# Patient Record
Sex: MALE | Employment: FULL TIME | URBAN - METROPOLITAN AREA
[De-identification: names, ages, dates, MRNs, and addresses within clinical notes are randomized per-mention and may not be internally consistent; named-entity substitution may affect disease eponyms.]

---

## 2020-04-03 ENCOUNTER — OFFICE VISIT (OUTPATIENT)
Dept: NEUROLOGY | Age: 55
End: 2020-04-03

## 2020-04-03 VITALS
OXYGEN SATURATION: 99 % | BODY MASS INDEX: 34.65 KG/M2 | HEART RATE: 80 BPM | DIASTOLIC BLOOD PRESSURE: 80 MMHG | SYSTOLIC BLOOD PRESSURE: 150 MMHG | RESPIRATION RATE: 16 BRPM | HEIGHT: 74 IN | WEIGHT: 270 LBS | TEMPERATURE: 98.6 F

## 2020-04-03 DIAGNOSIS — R20.2 PARESTHESIA: ICD-10-CM

## 2020-04-03 DIAGNOSIS — R20.2 PARESTHESIA: Primary | ICD-10-CM

## 2020-04-03 RX ORDER — LISINOPRIL 20 MG/1
20 TABLET ORAL DAILY
COMMUNITY

## 2020-04-03 RX ORDER — GABAPENTIN 600 MG/1
1200 TABLET ORAL DAILY
COMMUNITY
Start: 2020-01-21

## 2020-04-03 RX ORDER — HYDROCHLOROTHIAZIDE 25 MG/1
25 TABLET ORAL DAILY
COMMUNITY
Start: 2020-02-02

## 2020-04-03 RX ORDER — DULOXETIN HYDROCHLORIDE 30 MG/1
30 CAPSULE, DELAYED RELEASE ORAL DAILY
Qty: 30 CAP | Refills: 3 | Status: SHIPPED | OUTPATIENT
Start: 2020-04-03 | End: 2020-10-08

## 2020-04-03 RX ORDER — ATORVASTATIN CALCIUM 20 MG/1
20 TABLET, FILM COATED ORAL DAILY
COMMUNITY

## 2020-04-03 RX ORDER — GLIMEPIRIDE 4 MG/1
4 TABLET ORAL
COMMUNITY
Start: 2020-02-03

## 2020-04-03 RX ORDER — METFORMIN HYDROCHLORIDE 750 MG/1
750 TABLET, EXTENDED RELEASE ORAL DAILY
COMMUNITY

## 2020-04-03 NOTE — LETTER
4/3/20 Patient: Adriano Weber YOB: 1965 Date of Visit: 4/3/2020 Capri Marin MD 
[address] [facility] [address] [facility] [address] VIA Facsimile: [phone] [address] [place] [address] 
[address] [facility] 7 [phone] VIA In Basket Dear Capri Marin MD 
[address] [place] [address], Thank you for referring Mr. Mya Haddad to [facility] for evaluation. My notes for this consultation are attached. If you have questions, please do not hesitate to call me. I look forward to following your patient along with you. Sincerely, Sanchez Perdue MD

## 2020-04-03 NOTE — PROGRESS NOTES
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 C/o stinging, burning feeling, sometimes feels like a knife in his feet, and do sometimes go numb. X3 weeks he started noticing it in his hands. \"Sometimes driving home, my hands hurt so bad, I just want to cry.\"). HPI:     For the past 1 1/2 yrs, patient noted intermittent numbness in both feet, described as on a cinder block, symmetric. Seeing a podiatrist Dr Katerin Franco. 2 months ago, patient noted constant  pain and numbness of both hands, symmetric, affecting all fingers, 5th finger is worse, more noticeable when driving, wakes patient up at night. PCP started on Gabapentin 600 mg 2 tabs in am and 4 TABS pm with little benefit. Recent Hgba1c 10  (-) lower back pain  (-) neck pain  (-) elbow pain  (-) wrist pain     ROS   (-) fever  (-) rash  All other systems reviewed and are negative    Past Medical Hx  Past Medical History:   Diagnosis Date    Diabetes ([facility]     Hypercholesterolemia     Hypertension        Social Hx  Social History     Socioeconomic History    Marital status: UNKNOWN     Spouse name: Not on file    Number of children: Not on file    Years of education: Not on file    Highest education level: Not on file   Tobacco Use    Smoking status: Never Smoker    Smokeless tobacco: Never Used       Family Hx  No family history on file. ALLERGIES  Allergies   Allergen Reactions    Codeine Nausea and Vomiting     Patient states gets violently ill. CURRENT MEDS  Current Outpatient Medications   Medication Sig Dispense Refill    gabapentin (NEURONTIN) 600 mg tablet Take  by mouth.  Take 2 in the am and up to 4 in the pm      glimepiride (AMARYL) 4 mg tablet TAKE 1 TABLET BY MOUTH ONCE DAILY WITH BREAKFAST OR FIRST MAIN MEAL OF THE DAY      hydroCHLOROthiazide (HYDRODIURIL) 25 mg tablet Take  by mouth daily.  lisinopriL (PRINIVIL, ZESTRIL) 10 mg tablet lisinopril 10 mg tablet   Take 1 tablet every day by oral route.  metFORMIN (GLUCOPHAGE) 500 mg tablet Take  by mouth.  atorvastatin calcium (ATORVASTATIN PO) Take  by mouth.  DULoxetine (CYMBALTA) 30 mg capsule Take 1 Cap by mouth daily. 30 Cap 3           PREVIOUS WORKUP: (reviewed)  IMAGING:    CT Results (recent):  No results found for this or any previous visit. MRI Results (recent):  No results found for this or any previous visit. IR Results (recent):  No results found for this or any previous visit. VAS/US Results (recent):  No results found for this or any previous visit. LABS (reviewed)  Results for orders placed or performed during the hospital encounter of 08/09/11   STRESS TEST CARDIAC   Result Value Ref Range    Diagnosis       PROBABLY NORMAL STRESS TEST WITH ECG CHANGES AS NOTED ABOVE AND HYPERTENSIVE   RESPONSE TO EXERCISE.190  Confirmed by Rachel Hogan M.D., Richrd Leghorn ([id number]),  Jessica Daniel ([id number]) on   8/9/2011 11:12:55 AM    Test indication Chest Discomfort     Functional capacity Normal     ECG Interp. Before Exercise Normal     ECG Interp. During Exercise Depression upsloping OF 0,5 TO 0.8 MM IN INFEROLA     Overall HR response to exercise appropriate     Overall BP response to exercise resting hypertension - exaggerated response     Max. Systolic  mmHg    Max. Diastolic BP 80 mmHg    Max.  Heart rate 153 BPM    Duke treadmill score      Willard TM score result      Peak Ex METs 8.5 METS    Protocol name LEYLA                Known cardiac condition      Attending physician Sam Patricio MD        Physical Exam:     Visit Vitals  /80 (BP 1 Location: Right arm, BP Patient Position: Sitting)   Pulse 80   Temp 98.6 °F (37 °C) (Oral)   Resp 16   Ht 6' 2\" (1.88 m)   Wt 122.5 kg (270 lb)   SpO2 99%   BMI 34.67 kg/m²     General:  Alert, cooperative, no distress. Head:  Normocephalic, without obvious abnormality, atraumatic. Eyes:  Conjunctivae/corneas clear. Lungs:  Heart:   Non labored breathing  Regular rate and rhythm, no carotid bruits   Abdomen:   Soft, non-distended   Extremities: Extremities normal, atraumatic, no cyanosis or edema. Pulses: 2+ and symmetric all extremities. Skin: Skin color, texture, turgor normal. No rashes or lesions. Neurologic Exam     Gen: Attention normal             Language: naming, repetition, fluency normal             Memory: intact recent and remote memory  Cranial Nerves:  I: smell Not tested   II: visual fields Full to confrontation   II: pupils Equal, round, reactive to light   II: optic disc No papilledema   III,VII: ptosis none   III,IV,VI: extraocular muscles  Full ROM   V: mastication normal   V: facial light touch sensation  normal   VII: facial muscle function   symmetric   VIII: hearing symmetric   IX: soft palate elevation  normal   XI: trapezius strength  5/5   XI: sternocleidomastoid strength 5/5   XI: neck flexion strength  5/5   XII: tongue  midline     Motor: normal bulk and tone, no tremor              Strength: 5/5 all four extremities  Sensory: dec PP tips of toes to 2 inches above ankles  Reflexes: 2+ UE 1+ knees and trace ankles; Down going toes  Coordination: Good FTN and HTS  Gait: normal gait including tandem; able to walk on toes and heels           Impression:     Jaki Jimenez is a 53 y.o. male who  has a past medical history of Diabetes ([address] Hypercholesterolemia, and Hypertension. who for the past 1 1/2 yrs, patient noted intermittent numbness in both feet, described as on a cinder block, symmetric. Seeing a podiatrist Dr Dominick Wong due to foot ulcers. Consideration includes generalized sensory polyneuropathy due to uncontrolled diabetes. (Recent Hgba1c 10).  Second, 2 months ago, patient noted constant  pain and numbness of both hands, symmetric, affecting all fingers, 5th finger is worse, more noticeable when driving, wakes patient up at night. Differentials include progression of his polyneuropathy or superimposed carpal tunnel syndrome ( sleeps with wrist bent). PCP started on Gabapentin 600 mg 2 tabs in am and 4 tabs pm with little benefit. RECOMMENDATIONS  1. I had a long discussion with patient. Discussed diagnosis, prognosis, pathophysiology and available treatment. All questions were answered. 2. Blood test for TSH, Vit B12, Folate, Vit B6, MANDI, ESR, SPEP/ANA  3. EMG/NCS of the L UE and L LE with polyneuropathy and CTS protocol (Unfortunately, this is scheduled later due to [place] situation)   4. Will add Cymbalta 30 mg every day   5. Trial of compounded pain cream  6. Advise to wear bilateral wrist splints at night  7. Already on Gabapentin 600 mg 2 tabs in am and 4 tabs pm   8. Follow up with his Podiatrist for foot ulcers  9. Optimize medical management of diabetes c/o PCP to prevent worsening of polyneuropathy      Follow-up and Dispositions    · Return in about 1 month (around 5/3/2020).             Thank you for the consultation      Eusebio Kirkpatrick MD  Diplomate, American Board of Psychiatry and Neurology  Diplomate, Neuromuscular Medicine  Diplomate, American Board of Electrodiagnostic Medicine        CC: Devin Martinez MD  Fax: [phone]

## 2020-04-03 NOTE — PROGRESS NOTES
Chief Complaint   Patient presents with    New Patient     New patient ref from [address] for neuropathy in feet. C/o stinging, burning feeling, sometimes feels like a knife in his feet, and do sometimes go numb. X3 weeks he started noticing it in his hands. \"Sometimes driving home, my hands hurt so bad, I just want to cry. \"     Visit Vitals  /80 (BP 1 Location: Right arm, BP Patient Position: Sitting)   Pulse 80   Temp 98.6 °F (37 °C) (Oral)   Resp 16   Ht 6' 2\" (1.88 m)   Wt 122.5 kg (270 lb)   SpO2 99%   BMI 34.67 kg/m²

## 2020-05-07 ENCOUNTER — VIRTUAL VISIT (OUTPATIENT)
Dept: NEUROLOGY | Age: 55
End: 2020-05-07

## 2020-05-07 DIAGNOSIS — R20.2 PARESTHESIA: Primary | ICD-10-CM

## 2020-05-07 NOTE — LETTER
5/7/2020 9:25 AM 
 
Patient:  Catherine Miller YOB: 1965 Date of Visit: 5/7/2020 Dear Larance Cockayne, MD 
[address] 7 [address] VIA Facsimile: [phone]: Thank you for referring Mr. Evan Mayer to me for evaluation/treatment. Below are the relevant portions of my assessment and plan of care. If you have questions, please do not hesitate to call me. I look forward to following Mr. Felix along with you. Sincerely, Benny Espinoza MD

## 2020-05-07 NOTE — PROGRESS NOTES
Chief Complaint   Patient presents with    Follow-up     Virtual visit--follow up for neuropathy. EMG was done 6/12/20.

## 2020-05-11 ENCOUNTER — APPOINTMENT (OUTPATIENT)
Dept: CT IMAGING | Age: 55
DRG: 854 | End: 2020-05-11
Attending: PHYSICIAN ASSISTANT
Payer: SELF-PAY

## 2020-05-11 ENCOUNTER — APPOINTMENT (OUTPATIENT)
Dept: GENERAL RADIOLOGY | Age: 55
DRG: 854 | End: 2020-05-11
Attending: PHYSICIAN ASSISTANT
Payer: SELF-PAY

## 2020-05-11 ENCOUNTER — HOSPITAL ENCOUNTER (INPATIENT)
Age: 55
LOS: 4 days | Discharge: HOME | DRG: 854 | End: 2020-05-15
Attending: EMERGENCY MEDICINE | Admitting: FAMILY MEDICINE
Payer: SELF-PAY

## 2020-05-11 ENCOUNTER — APPOINTMENT (OUTPATIENT)
Dept: GENERAL RADIOLOGY | Age: 55
DRG: 854 | End: 2020-05-11
Attending: EMERGENCY MEDICINE
Payer: SELF-PAY

## 2020-05-11 DIAGNOSIS — E11.628 DIABETIC FOOT INFECTION (HCC): ICD-10-CM

## 2020-05-11 DIAGNOSIS — L08.9 DIABETIC FOOT INFECTION (HCC): ICD-10-CM

## 2020-05-11 DIAGNOSIS — A41.9 SEPSIS WITHOUT ACUTE ORGAN DYSFUNCTION, DUE TO UNSPECIFIED ORGANISM (HCC): Primary | ICD-10-CM

## 2020-05-11 LAB
ALBUMIN SERPL-MCNC: 3.8 G/DL (ref 3.5–5)
ALBUMIN/GLOB SERPL: 0.9 {RATIO} (ref 1.1–2.2)
ALP SERPL-CCNC: 108 U/L (ref 45–117)
ALT SERPL-CCNC: 31 U/L (ref 12–78)
ANION GAP SERPL CALC-SCNC: 5 MMOL/L (ref 5–15)
APPEARANCE UR: CLEAR
AST SERPL-CCNC: 16 U/L (ref 15–37)
BASOPHILS # BLD: 0.1 K/UL (ref 0–0.1)
BASOPHILS NFR BLD: 0 % (ref 0–1)
BILIRUB SERPL-MCNC: 0.6 MG/DL (ref 0.2–1)
BILIRUB UR QL: NEGATIVE
BUN SERPL-MCNC: 13 MG/DL (ref 6–20)
BUN/CREAT SERPL: 14 (ref 12–20)
CALCIUM SERPL-MCNC: 9.1 MG/DL (ref 8.5–10.1)
CHLORIDE SERPL-SCNC: 99 MMOL/L (ref 97–108)
CO2 SERPL-SCNC: 28 MMOL/L (ref 21–32)
COLOR UR: ABNORMAL
COMMENT, HOLDF: NORMAL
CREAT SERPL-MCNC: 0.94 MG/DL (ref 0.7–1.3)
DIFFERENTIAL METHOD BLD: ABNORMAL
EOSINOPHIL # BLD: 0 K/UL (ref 0–0.4)
EOSINOPHIL NFR BLD: 0 % (ref 0–7)
ERYTHROCYTE [DISTWIDTH] IN BLOOD BY AUTOMATED COUNT: 13.2 % (ref 11.5–14.5)
EST. AVERAGE GLUCOSE BLD GHB EST-MCNC: 258 MG/DL
FERRITIN SERPL-MCNC: 285 NG/ML (ref 26–388)
GLOBULIN SER CALC-MCNC: 4.2 G/DL (ref 2–4)
GLUCOSE SERPL-MCNC: 257 MG/DL (ref 65–100)
GLUCOSE UR STRIP.AUTO-MCNC: 250 MG/DL
HBA1C MFR BLD: 10.6 % (ref 4–5.6)
HCT VFR BLD AUTO: 41.9 % (ref 36.6–50.3)
HGB BLD-MCNC: 13.4 G/DL (ref 12.1–17)
HGB UR QL STRIP: NEGATIVE
IMM GRANULOCYTES # BLD AUTO: 0.1 K/UL (ref 0–0.04)
IMM GRANULOCYTES NFR BLD AUTO: 1 % (ref 0–0.5)
KETONES UR QL STRIP.AUTO: NEGATIVE MG/DL
LACTATE SERPL-SCNC: 1.8 MMOL/L (ref 0.4–2)
LACTATE SERPL-SCNC: 2.2 MMOL/L (ref 0.4–2)
LDH SERPL L TO P-CCNC: 264 U/L (ref 85–241)
LEUKOCYTE ESTERASE UR QL STRIP.AUTO: NEGATIVE
LYMPHOCYTES # BLD: 1.4 K/UL (ref 0.8–3.5)
LYMPHOCYTES NFR BLD: 9 % (ref 12–49)
MCH RBC QN AUTO: 27.8 PG (ref 26–34)
MCHC RBC AUTO-ENTMCNC: 32 G/DL (ref 30–36.5)
MCV RBC AUTO: 86.9 FL (ref 80–99)
MONOCYTES # BLD: 1.4 K/UL (ref 0–1)
MONOCYTES NFR BLD: 9 % (ref 5–13)
NEUTS SEG # BLD: 12 K/UL (ref 1.8–8)
NEUTS SEG NFR BLD: 81 % (ref 32–75)
NITRITE UR QL STRIP.AUTO: NEGATIVE
NRBC # BLD: 0 K/UL (ref 0–0.01)
NRBC BLD-RTO: 0 PER 100 WBC
PH UR STRIP: 5.5 [PH] (ref 5–8)
PLATELET # BLD AUTO: 199 K/UL (ref 150–400)
PMV BLD AUTO: 11.1 FL (ref 8.9–12.9)
POTASSIUM SERPL-SCNC: 3.8 MMOL/L (ref 3.5–5.1)
PROT SERPL-MCNC: 8 G/DL (ref 6.4–8.2)
PROT UR STRIP-MCNC: NEGATIVE MG/DL
RBC # BLD AUTO: 4.82 M/UL (ref 4.1–5.7)
SAMPLES BEING HELD,HOLD: NORMAL
SODIUM SERPL-SCNC: 132 MMOL/L (ref 136–145)
SP GR UR REFRACTOMETRY: 1.01
UROBILINOGEN UR QL STRIP.AUTO: 1 EU/DL (ref 0.2–1)
WBC # BLD AUTO: 14.9 K/UL (ref 4.1–11.1)

## 2020-05-11 PROCEDURE — 74011250637 HC RX REV CODE- 250/637: Performed by: PHYSICIAN ASSISTANT

## 2020-05-11 PROCEDURE — 99285 EMERGENCY DEPT VISIT HI MDM: CPT

## 2020-05-11 PROCEDURE — 83036 HEMOGLOBIN GLYCOSYLATED A1C: CPT

## 2020-05-11 PROCEDURE — 87077 CULTURE AEROBIC IDENTIFY: CPT

## 2020-05-11 PROCEDURE — 87205 SMEAR GRAM STAIN: CPT

## 2020-05-11 PROCEDURE — 81003 URINALYSIS AUTO W/O SCOPE: CPT

## 2020-05-11 PROCEDURE — 96374 THER/PROPH/DIAG INJ IV PUSH: CPT

## 2020-05-11 PROCEDURE — 72193 CT PELVIS W/DYE: CPT

## 2020-05-11 PROCEDURE — 65660000000 HC RM CCU STEPDOWN

## 2020-05-11 PROCEDURE — 74011000258 HC RX REV CODE- 258: Performed by: RADIOLOGY

## 2020-05-11 PROCEDURE — 87040 BLOOD CULTURE FOR BACTERIA: CPT

## 2020-05-11 PROCEDURE — 74011250636 HC RX REV CODE- 250/636: Performed by: PHYSICIAN ASSISTANT

## 2020-05-11 PROCEDURE — 87186 SC STD MICRODIL/AGAR DIL: CPT

## 2020-05-11 PROCEDURE — 80053 COMPREHEN METABOLIC PANEL: CPT

## 2020-05-11 PROCEDURE — 87635 SARS-COV-2 COVID-19 AMP PRB: CPT

## 2020-05-11 PROCEDURE — 83615 LACTATE (LD) (LDH) ENZYME: CPT

## 2020-05-11 PROCEDURE — 74011000258 HC RX REV CODE- 258: Performed by: PHYSICIAN ASSISTANT

## 2020-05-11 PROCEDURE — 74011636320 HC RX REV CODE- 636/320: Performed by: RADIOLOGY

## 2020-05-11 PROCEDURE — 83605 ASSAY OF LACTIC ACID: CPT

## 2020-05-11 PROCEDURE — 36415 COLL VENOUS BLD VENIPUNCTURE: CPT

## 2020-05-11 PROCEDURE — 82728 ASSAY OF FERRITIN: CPT

## 2020-05-11 PROCEDURE — 85025 COMPLETE CBC W/AUTO DIFF WBC: CPT

## 2020-05-11 PROCEDURE — 73630 X-RAY EXAM OF FOOT: CPT

## 2020-05-11 PROCEDURE — 96361 HYDRATE IV INFUSION ADD-ON: CPT

## 2020-05-11 PROCEDURE — 71046 X-RAY EXAM CHEST 2 VIEWS: CPT

## 2020-05-11 RX ORDER — VANCOMYCIN 2 GRAM/500 ML IN 0.9 % SODIUM CHLORIDE INTRAVENOUS
2000 ONCE
Status: COMPLETED | OUTPATIENT
Start: 2020-05-11 | End: 2020-05-12

## 2020-05-11 RX ORDER — ACETAMINOPHEN 500 MG
1000 TABLET ORAL
Status: COMPLETED | OUTPATIENT
Start: 2020-05-11 | End: 2020-05-11

## 2020-05-11 RX ORDER — SODIUM CHLORIDE 0.9 % (FLUSH) 0.9 %
10 SYRINGE (ML) INJECTION
Status: COMPLETED | OUTPATIENT
Start: 2020-05-11 | End: 2020-05-11

## 2020-05-11 RX ORDER — ACETAMINOPHEN 650 MG/1
650 SUPPOSITORY RECTAL
Status: DISCONTINUED | OUTPATIENT
Start: 2020-05-11 | End: 2020-05-12

## 2020-05-11 RX ORDER — ACETAMINOPHEN 325 MG/1
650 TABLET ORAL
Status: DISCONTINUED | OUTPATIENT
Start: 2020-05-11 | End: 2020-05-12

## 2020-05-11 RX ORDER — MAGNESIUM SULFATE 100 %
4 CRYSTALS MISCELLANEOUS AS NEEDED
Status: DISCONTINUED | OUTPATIENT
Start: 2020-05-11 | End: 2020-05-15 | Stop reason: HOSPADM

## 2020-05-11 RX ORDER — SODIUM CHLORIDE 0.9 % (FLUSH) 0.9 %
5-10 SYRINGE (ML) INJECTION AS NEEDED
Status: DISCONTINUED | OUTPATIENT
Start: 2020-05-11 | End: 2020-05-15 | Stop reason: HOSPADM

## 2020-05-11 RX ORDER — DEXTROSE MONOHYDRATE 100 MG/ML
0-250 INJECTION, SOLUTION INTRAVENOUS AS NEEDED
Status: DISCONTINUED | OUTPATIENT
Start: 2020-05-11 | End: 2020-05-15 | Stop reason: HOSPADM

## 2020-05-11 RX ADMIN — SODIUM CHLORIDE 1000 ML: 900 INJECTION, SOLUTION INTRAVENOUS at 17:40

## 2020-05-11 RX ADMIN — SODIUM CHLORIDE 100 ML: 900 INJECTION, SOLUTION INTRAVENOUS at 18:54

## 2020-05-11 RX ADMIN — AMPICILLIN AND SULBACTAM 3 G: 2; 1 INJECTION, POWDER, FOR SOLUTION INTRAVENOUS at 19:53

## 2020-05-11 RX ADMIN — IOPAMIDOL 100 ML: 755 INJECTION, SOLUTION INTRAVENOUS at 18:54

## 2020-05-11 RX ADMIN — SODIUM CHLORIDE 1000 ML: 900 INJECTION, SOLUTION INTRAVENOUS at 17:39

## 2020-05-11 RX ADMIN — SODIUM CHLORIDE 1000 ML: 900 INJECTION, SOLUTION INTRAVENOUS at 19:38

## 2020-05-11 RX ADMIN — ACETAMINOPHEN 1000 MG: 500 TABLET, FILM COATED ORAL at 18:21

## 2020-05-11 RX ADMIN — Medication 10 ML: at 18:54

## 2020-05-11 RX ADMIN — SODIUM CHLORIDE 750 ML: 900 INJECTION, SOLUTION INTRAVENOUS at 21:52

## 2020-05-11 NOTE — ED TRIAGE NOTES
Pt arrives via squad c/o upper thigh pain and fever , +n/v, denies cp or sob , pt had a diabetic abscess to right foot for a month, +chills, fever in triage - pt did not know he had a fever , denies sob or cough

## 2020-05-11 NOTE — ED PROVIDER NOTES
Brunilda Amado is a 53 y.o. male  who presents by EMS to ER with c/o Patient presents with:  Leg Pain  Fever  Patient with right upper thigh pain, fever, chills and nausea and vomiting. Patient with diabetic foot ulcer to right foot, also history of abscess to right groin previously that was surgically drained. Patient denies chest pain or shortness of breath, denies any sick contacts. He specifically denies any chest pain, shortness of breath, headache, rash, diarrhea, abdominal pain, urinary/bowel changes, sweating or weight loss. PCP: Cha Cowart MD   PMHx significant for: Past Medical History:  No date: Diabetes ([facility]  No date: Hypercholesterolemia  No date: Hypertension   PSHx significant for: Past Surgical History:  No date: HX HEENT      Comment:  ear tubes  Social Hx: Tobacco use: Social History    Tobacco Use      Smoking status: Never Smoker      Smokeless tobacco: Never Used  ; EtOH use: The patient states he drinks 0 per week.; Illicit Drug use: Allergies:   -- Codeine -- Nausea and Vomiting    --  Patient states gets violently ill. There are no other complaints, changes or physical findings at this time. Past Medical History:   Diagnosis Date    [facility] ([facility]     Hypercholesterolemia     Hypertension        Past Surgical History:   Procedure Laterality Date    HX HEENT      ear tubes         History reviewed. No pertinent family history.     Social History     Socioeconomic History    Marital status: UNKNOWN     Spouse name: Not on file    Number of children: Not on file    Years of education: Not on file    Highest education level: Not on file   Occupational History    Not on file   Social Needs    Financial resource strain: Not on file    Food insecurity     Worry: Not on file     Inability: Not on file    Transportation needs     Medical: Not on file     Non-medical: Not on file   Tobacco Use    Smoking status: Never Smoker    Smokeless tobacco: Never Used Substance and Sexual Activity    Alcohol use: Not on file    Drug use: Not on file    Sexual activity: Not on file   Lifestyle    Physical activity     Days per week: Not on file     Minutes per session: Not on file    Stress: Not on file   Relationships    Social connections     Talks on phone: Not on file     Gets together: Not on file     Attends Yazdanism service: Not on file     Active member of club or organization: Not on file     Attends meetings of clubs or organizations: Not on file     Relationship status: Not on file    Intimate partner violence     Fear of current or ex partner: Not on file     Emotionally abused: Not on file     Physically abused: Not on file     Forced sexual activity: Not on file   Other Topics Concern    Not on file   Social History Narrative    Not on file         ALLERGIES: Codeine    Review of Systems   Constitutional: Positive for chills and fever. Negative for activity change and appetite change. HENT: Negative for congestion and sore throat. Respiratory: Negative for cough and shortness of breath. Cardiovascular: Negative for chest pain. Gastrointestinal: Negative for abdominal pain, diarrhea, nausea and vomiting. Genitourinary: Negative for dysuria. Musculoskeletal: Positive for myalgias. Negative for arthralgias. Skin: Negative for color change. Neurological: Negative for dizziness. Psychiatric/Behavioral: The patient is not nervous/anxious. All other systems reviewed and are negative. Vitals:    05/11/20 1413   BP: 114/72   Pulse: (!) 110   Resp: 16   Temp: (!) 101 °F (38.3 °C)   SpO2: 95%            Physical Exam  Vitals signs and nursing note reviewed. Constitutional:       Appearance: He is well-developed. HENT:      Head: Normocephalic and atraumatic. Right Ear: External ear normal.      Left Ear: External ear normal.      Mouth/Throat:      Pharynx: No oropharyngeal exudate. Eyes:      General: No scleral icterus. Right eye: No discharge. Left eye: No discharge. Conjunctiva/sclera: Conjunctivae normal.      Pupils: Pupils are equal, round, and reactive to light. Neck:      Musculoskeletal: Normal range of motion and neck supple. Thyroid: No thyromegaly. Trachea: No tracheal deviation. Cardiovascular:      Rate and Rhythm: Regular rhythm. Tachycardia present. Heart sounds: Normal heart sounds. No murmur. Pulmonary:      Effort: Pulmonary effort is normal. No respiratory distress. Breath sounds: Normal breath sounds. No wheezing or rales. Abdominal:      General: Bowel sounds are normal. There is no distension. Palpations: Abdomen is soft. Tenderness: There is no abdominal tenderness. There is no guarding or rebound. Musculoskeletal: Normal range of motion. General: No tenderness. Legs:         Feet:       Comments: RLE is NVI   Lymphadenopathy:      Cervical: No cervical adenopathy. Skin:     General: Skin is warm. Findings: No erythema or rash. Neurological:      Mental Status: He is alert and oriented to person, place, and time. Cranial Nerves: No cranial nerve deficit. Coordination: Coordination normal.   Psychiatric:         Behavior: Behavior normal.         Thought Content: Thought content normal.         Judgment: Judgment normal.          [facility]       Procedures          Hospitalist Perfect Serve for Admission  8:15 PM    ED Room Number: RK75/21  Patient Name and age:  Juni Boone 53 y.o.  male  Working Diagnosis:   1. Sepsis without acute organ dysfunction, due to unspecified organism ([address]    2. Diabetic foot infection ([facility]        COVID-19 Suspicion:  no    Code Status:  Full Code  Readmission: no  Isolation Requirements:  no  Recommended Level of Care:  telemetry  Department:[facility] Adult ED - 21   Other:  Diabetic foot wound with pus drainage.  Elevated initial lactic, started unasyn       I was personally available for consultation in the emergency department. I have reviewed the chart and agree with the documentation recorded by the [facility], including the assessment, treatment plan, and disposition.   Benjy Mason MD

## 2020-05-12 LAB
ANION GAP SERPL CALC-SCNC: 7 MMOL/L (ref 5–15)
APTT PPP: 27.8 SEC (ref 22.1–32)
BASOPHILS # BLD: 0.1 K/UL (ref 0–0.1)
BASOPHILS NFR BLD: 1 % (ref 0–1)
BUN SERPL-MCNC: 11 MG/DL (ref 6–20)
BUN/CREAT SERPL: 14 (ref 12–20)
CALCIUM SERPL-MCNC: 7.8 MG/DL (ref 8.5–10.1)
CHLORIDE SERPL-SCNC: 104 MMOL/L (ref 97–108)
CO2 SERPL-SCNC: 25 MMOL/L (ref 21–32)
COMMENT, HOLDF: NORMAL
CREAT SERPL-MCNC: 0.8 MG/DL (ref 0.7–1.3)
D DIMER PPP FEU-MCNC: 0.51 MG/L FEU (ref 0–0.65)
DIFFERENTIAL METHOD BLD: ABNORMAL
EOSINOPHIL # BLD: 0.1 K/UL (ref 0–0.4)
EOSINOPHIL NFR BLD: 1 % (ref 0–7)
ERYTHROCYTE [DISTWIDTH] IN BLOOD BY AUTOMATED COUNT: 13.4 % (ref 11.5–14.5)
FIBRINOGEN PPP-MCNC: 514 MG/DL (ref 200–475)
GLUCOSE BLD STRIP.AUTO-MCNC: 143 MG/DL (ref 65–100)
GLUCOSE BLD STRIP.AUTO-MCNC: 182 MG/DL (ref 65–100)
GLUCOSE BLD STRIP.AUTO-MCNC: 186 MG/DL (ref 65–100)
GLUCOSE BLD STRIP.AUTO-MCNC: 243 MG/DL (ref 65–100)
GLUCOSE SERPL-MCNC: 215 MG/DL (ref 65–100)
HCT VFR BLD AUTO: 36.2 % (ref 36.6–50.3)
HGB BLD-MCNC: 11.6 G/DL (ref 12.1–17)
IMM GRANULOCYTES # BLD AUTO: 0.1 K/UL (ref 0–0.04)
IMM GRANULOCYTES NFR BLD AUTO: 1 % (ref 0–0.5)
INR PPP: 1 (ref 0.9–1.1)
LYMPHOCYTES # BLD: 2.4 K/UL (ref 0.8–3.5)
LYMPHOCYTES NFR BLD: 26 % (ref 12–49)
MCH RBC QN AUTO: 28.2 PG (ref 26–34)
MCHC RBC AUTO-ENTMCNC: 32 G/DL (ref 30–36.5)
MCV RBC AUTO: 88.1 FL (ref 80–99)
MONOCYTES # BLD: 1.1 K/UL (ref 0–1)
MONOCYTES NFR BLD: 12 % (ref 5–13)
NEUTS SEG # BLD: 5.7 K/UL (ref 1.8–8)
NEUTS SEG NFR BLD: 59 % (ref 32–75)
NRBC # BLD: 0 K/UL (ref 0–0.01)
NRBC BLD-RTO: 0 PER 100 WBC
PLATELET # BLD AUTO: 169 K/UL (ref 150–400)
PMV BLD AUTO: 11 FL (ref 8.9–12.9)
POTASSIUM SERPL-SCNC: 3.6 MMOL/L (ref 3.5–5.1)
PROTHROMBIN TIME: 10.8 SEC (ref 9–11.1)
RBC # BLD AUTO: 4.11 M/UL (ref 4.1–5.7)
SAMPLES BEING HELD,HOLD: NORMAL
SARS-COV-2, COV2: NOT DETECTED
SERVICE CMNT-IMP: ABNORMAL
SODIUM SERPL-SCNC: 136 MMOL/L (ref 136–145)
SPECIMEN SOURCE, FCOV2M: NORMAL
THERAPEUTIC RANGE,PTTT: NORMAL SECS (ref 58–77)
TROPONIN I SERPL-MCNC: <0.05 NG/ML
WBC # BLD AUTO: 9.4 K/UL (ref 4.1–11.1)

## 2020-05-12 PROCEDURE — 74011250637 HC RX REV CODE- 250/637: Performed by: INTERNAL MEDICINE

## 2020-05-12 PROCEDURE — 65270000032 HC RM SEMIPRIVATE

## 2020-05-12 PROCEDURE — 85025 COMPLETE CBC W/AUTO DIFF WBC: CPT

## 2020-05-12 PROCEDURE — 74011000258 HC RX REV CODE- 258: Performed by: FAMILY MEDICINE

## 2020-05-12 PROCEDURE — 74011636637 HC RX REV CODE- 636/637: Performed by: INTERNAL MEDICINE

## 2020-05-12 PROCEDURE — 82962 GLUCOSE BLOOD TEST: CPT

## 2020-05-12 PROCEDURE — 84484 ASSAY OF TROPONIN QUANT: CPT

## 2020-05-12 PROCEDURE — 85610 PROTHROMBIN TIME: CPT

## 2020-05-12 PROCEDURE — 74011250637 HC RX REV CODE- 250/637: Performed by: FAMILY MEDICINE

## 2020-05-12 PROCEDURE — 74011250636 HC RX REV CODE- 250/636: Performed by: FAMILY MEDICINE

## 2020-05-12 PROCEDURE — 85384 FIBRINOGEN ACTIVITY: CPT

## 2020-05-12 PROCEDURE — 85379 FIBRIN DEGRADATION QUANT: CPT

## 2020-05-12 PROCEDURE — 36415 COLL VENOUS BLD VENIPUNCTURE: CPT

## 2020-05-12 PROCEDURE — 80048 BASIC METABOLIC PNL TOTAL CA: CPT

## 2020-05-12 PROCEDURE — 85730 THROMBOPLASTIN TIME PARTIAL: CPT

## 2020-05-12 RX ORDER — LISINOPRIL 20 MG/1
20 TABLET ORAL DAILY
Status: DISCONTINUED | OUTPATIENT
Start: 2020-05-12 | End: 2020-05-15 | Stop reason: HOSPADM

## 2020-05-12 RX ORDER — GABAPENTIN 600 MG/1
300 TABLET ORAL 2 TIMES DAILY
Status: DISCONTINUED | OUTPATIENT
Start: 2020-05-12 | End: 2020-05-12

## 2020-05-12 RX ORDER — VANCOMYCIN 1.75 GRAM/500 ML IN 0.9 % SODIUM CHLORIDE INTRAVENOUS
1750 EVERY 12 HOURS
Status: DISCONTINUED | OUTPATIENT
Start: 2020-05-12 | End: 2020-05-15 | Stop reason: HOSPADM

## 2020-05-12 RX ORDER — DULOXETIN HYDROCHLORIDE 30 MG/1
30 CAPSULE, DELAYED RELEASE ORAL DAILY
Status: DISCONTINUED | OUTPATIENT
Start: 2020-05-12 | End: 2020-05-15 | Stop reason: HOSPADM

## 2020-05-12 RX ORDER — ACETAMINOPHEN 325 MG/1
650 TABLET ORAL
Status: DISCONTINUED | OUTPATIENT
Start: 2020-05-12 | End: 2020-05-15 | Stop reason: HOSPADM

## 2020-05-12 RX ORDER — LISINOPRIL 20 MG/1
20 TABLET ORAL DAILY
Status: DISCONTINUED | OUTPATIENT
Start: 2020-05-13 | End: 2020-05-12

## 2020-05-12 RX ORDER — VANCOMYCIN HYDROCHLORIDE
1250 ONCE
Status: DISCONTINUED | OUTPATIENT
Start: 2020-05-12 | End: 2020-05-12 | Stop reason: DRUGHIGH

## 2020-05-12 RX ORDER — GABAPENTIN 600 MG/1
2400 TABLET ORAL EVERY EVENING
Status: DISCONTINUED | OUTPATIENT
Start: 2020-05-12 | End: 2020-05-15 | Stop reason: HOSPADM

## 2020-05-12 RX ORDER — SODIUM CHLORIDE 0.9 % (FLUSH) 0.9 %
5-40 SYRINGE (ML) INJECTION EVERY 8 HOURS
Status: DISCONTINUED | OUTPATIENT
Start: 2020-05-12 | End: 2020-05-15 | Stop reason: HOSPADM

## 2020-05-12 RX ORDER — LISINOPRIL 10 MG/1
10 TABLET ORAL DAILY
Status: DISCONTINUED | OUTPATIENT
Start: 2020-05-12 | End: 2020-05-12

## 2020-05-12 RX ORDER — GABAPENTIN 600 MG/1
2400 TABLET ORAL EVERY EVENING
COMMUNITY

## 2020-05-12 RX ORDER — DEXTROSE MONOHYDRATE 100 MG/ML
0-250 INJECTION, SOLUTION INTRAVENOUS AS NEEDED
Status: DISCONTINUED | OUTPATIENT
Start: 2020-05-12 | End: 2020-05-15 | Stop reason: HOSPADM

## 2020-05-12 RX ORDER — ATORVASTATIN CALCIUM 20 MG/1
20 TABLET, FILM COATED ORAL DAILY
Status: DISCONTINUED | OUTPATIENT
Start: 2020-05-12 | End: 2020-05-15 | Stop reason: HOSPADM

## 2020-05-12 RX ORDER — INSULIN GLARGINE 100 [IU]/ML
12 INJECTION, SOLUTION SUBCUTANEOUS DAILY
Status: DISCONTINUED | OUTPATIENT
Start: 2020-05-12 | End: 2020-05-13

## 2020-05-12 RX ORDER — INSULIN LISPRO 100 [IU]/ML
INJECTION, SOLUTION INTRAVENOUS; SUBCUTANEOUS
Status: DISCONTINUED | OUTPATIENT
Start: 2020-05-12 | End: 2020-05-15 | Stop reason: HOSPADM

## 2020-05-12 RX ORDER — HEPARIN SODIUM 5000 [USP'U]/ML
5000 INJECTION, SOLUTION INTRAVENOUS; SUBCUTANEOUS EVERY 8 HOURS
Status: DISCONTINUED | OUTPATIENT
Start: 2020-05-12 | End: 2020-05-13

## 2020-05-12 RX ORDER — ACETAMINOPHEN 650 MG/1
650 SUPPOSITORY RECTAL
Status: DISCONTINUED | OUTPATIENT
Start: 2020-05-12 | End: 2020-05-15 | Stop reason: HOSPADM

## 2020-05-12 RX ORDER — GABAPENTIN 600 MG/1
1200 TABLET ORAL
Status: DISCONTINUED | OUTPATIENT
Start: 2020-05-12 | End: 2020-05-15 | Stop reason: HOSPADM

## 2020-05-12 RX ORDER — SODIUM CHLORIDE 0.9 % (FLUSH) 0.9 %
5-40 SYRINGE (ML) INJECTION AS NEEDED
Status: DISCONTINUED | OUTPATIENT
Start: 2020-05-12 | End: 2020-05-15 | Stop reason: HOSPADM

## 2020-05-12 RX ORDER — SODIUM CHLORIDE 9 MG/ML
75 INJECTION, SOLUTION INTRAVENOUS CONTINUOUS
Status: DISCONTINUED | OUTPATIENT
Start: 2020-05-12 | End: 2020-05-13

## 2020-05-12 RX ADMIN — DULOXETINE 30 MG: 30 CAPSULE, DELAYED RELEASE ORAL at 12:37

## 2020-05-12 RX ADMIN — CEFEPIME HYDROCHLORIDE 2 G: 2 INJECTION, POWDER, FOR SOLUTION INTRAVENOUS at 03:45

## 2020-05-12 RX ADMIN — INSULIN LISPRO 2 UNITS: 100 INJECTION, SOLUTION INTRAVENOUS; SUBCUTANEOUS at 16:59

## 2020-05-12 RX ADMIN — VANCOMYCIN HYDROCHLORIDE 2000 MG: 10 INJECTION, POWDER, LYOPHILIZED, FOR SOLUTION INTRAVENOUS at 00:31

## 2020-05-12 RX ADMIN — HEPARIN SODIUM 5000 UNITS: 5000 INJECTION, SOLUTION INTRAVENOUS; SUBCUTANEOUS at 03:45

## 2020-05-12 RX ADMIN — GABAPENTIN 1200 MG: 600 TABLET, FILM COATED ORAL at 13:36

## 2020-05-12 RX ADMIN — CEFEPIME HYDROCHLORIDE 2 G: 2 INJECTION, POWDER, FOR SOLUTION INTRAVENOUS at 11:00

## 2020-05-12 RX ADMIN — SODIUM CHLORIDE 75 ML/HR: 900 INJECTION, SOLUTION INTRAVENOUS at 03:45

## 2020-05-12 RX ADMIN — CEFEPIME HYDROCHLORIDE 2 G: 2 INJECTION, POWDER, FOR SOLUTION INTRAVENOUS at 19:09

## 2020-05-12 RX ADMIN — INSULIN GLARGINE 12 UNITS: 100 INJECTION, SOLUTION SUBCUTANEOUS at 10:51

## 2020-05-12 RX ADMIN — Medication 10 ML: at 13:37

## 2020-05-12 RX ADMIN — GABAPENTIN 2400 MG: 600 TABLET, FILM COATED ORAL at 21:38

## 2020-05-12 RX ADMIN — ACETAMINOPHEN 650 MG: 325 TABLET, FILM COATED ORAL at 13:35

## 2020-05-12 RX ADMIN — HEPARIN SODIUM 5000 UNITS: 5000 INJECTION, SOLUTION INTRAVENOUS; SUBCUTANEOUS at 21:38

## 2020-05-12 RX ADMIN — LISINOPRIL 20 MG: 20 TABLET ORAL at 13:35

## 2020-05-12 RX ADMIN — HEPARIN SODIUM 5000 UNITS: 5000 INJECTION, SOLUTION INTRAVENOUS; SUBCUTANEOUS at 12:35

## 2020-05-12 RX ADMIN — INSULIN LISPRO 3 UNITS: 100 INJECTION, SOLUTION INTRAVENOUS; SUBCUTANEOUS at 12:36

## 2020-05-12 RX ADMIN — VANCOMYCIN HYDROCHLORIDE 1750 MG: 10 INJECTION, POWDER, LYOPHILIZED, FOR SOLUTION INTRAVENOUS at 12:37

## 2020-05-12 RX ADMIN — ATORVASTATIN CALCIUM 20 MG: 20 TABLET, FILM COATED ORAL at 12:37

## 2020-05-12 RX ADMIN — ACETAMINOPHEN 650 MG: 325 TABLET, FILM COATED ORAL at 03:47

## 2020-05-12 RX ADMIN — Medication 10 ML: at 21:38

## 2020-05-12 NOTE — H&P
History & Physical    Primary Care Provider: Eliot King MD  Source of Information: Patient   Chief complaint: Right leg pain    History of Presenting Illness: The patient is a 50-year-old gentleman with past medical history as below who presents to the hospital with the above-mentioned symptoms. Patient reports that he pain in his right leg. Reports that he has abscess and a blister on his right foot that has been there for at least 4 to 6 weeks. Patient reports that he has been seeing a podiatrist who is been \"cutting dead skin around the blister\". Patient reports that he started having some upper thigh pain today on the right leg associated with some nausea vomiting and feeling hot along with chills. Patient came to the ER was found to have fever and there were concerns for sepsis and was requested to be admitted under the hospitalist service. Patient reports that he has not had any shortness of breath or cough. Patient denies any new injuries. Patient does report that he has noticed some discharge from the blister on the foot. Patient denies any headache, blurry vision, sore throat, trouble swallowing, trouble with speech, chest pain, abdominal pain, constipation, diarrhea, urinary symptoms, focal or generalized neurological weakness, falls, injuries, hematemesis, hemoptysis, hematuria. Review of Systems:  A comprehensive review of systems was negative except for that written in the History of Present Illness. Past Medical History:   Diagnosis Date    Diabetes ([address]     Hypercholesterolemia     Hypertension       Past Surgical History:   Procedure Laterality Date    HX HEENT      ear tubes     Prior to Admission medications    Medication Sig Start Date End Date Taking? Authorizing Provider   gabapentin (NEURONTIN) 600 mg tablet Take  by mouth.  Take 2 in the am and up to 4 in the pm 1/21/20   Provider, Historical   glimepiride (AMARYL) 4 mg tablet TAKE 1 TABLET BY MOUTH ONCE DAILY WITH BREAKFAST OR FIRST MAIN MEAL OF THE DAY 2/3/20   Provider, Historical   hydroCHLOROthiazide (HYDRODIURIL) 25 mg tablet Take  by mouth daily. 2/2/20   Provider, Historical   lisinopriL (PRINIVIL, ZESTRIL) 10 mg tablet lisinopril 10 mg tablet   Take 1 tablet every day by oral route. Provider, Historical   metFORMIN (GLUCOPHAGE) 500 mg tablet Take  by mouth. Provider, Historical   atorvastatin calcium (ATORVASTATIN PO) Take  by mouth. Provider, Historical   DULoxetine (CYMBALTA) 30 mg capsule Take 1 Cap by mouth daily. 4/3/20   Mary Velasquez MD     Allergies   Allergen Reactions    Codeine Nausea and Vomiting     Patient states gets violently ill. History reviewed. No pertinent family history. SOCIAL HISTORY:  Patient resides:  Independently x   Assisted Living    SNF    With family care       Smoking history:   None x   Former    Chronic      Alcohol history:   None    Social x   Chronic      Ambulates:   Independently x   w/cane    w/walker    w/wc    CODE STATUS:  DNR    Full    Other      Objective:     Physical Exam:     Visit Vitals  /73   Pulse 96   Temp (!) 101 °F (38.3 °C)   Resp 17   SpO2 99%      O2 Device: Room air    General:  Alert, cooperative, no distress, appears stated age. Head:  Normocephalic, without obvious abnormality, atraumatic. Eyes:  Conjunctivae/corneas clear. PERRL, EOMs intact. Nose: Nares normal. Septum midline. Mucosa normal. No drainage or sinus tenderness. Throat: Lips, mucosa, and tongue normal. Teeth and gums normal.   Neck: Supple, symmetrical, trachea midline, no adenopathy, thyroid: no enlargement/tenderness/nodules, no carotid bruit and no JVD. Back:   Symmetric, no curvature. ROM normal. No CVA tenderness. Lungs:   Clear to auscultation bilaterally. Chest wall:  No tenderness or deformity. Heart:  Regular rate and rhythm, S1, S2 normal, no murmur, click, rub or gallop.    Abdomen: Soft, non-tender. Bowel sounds normal. No masses,  No organomegaly. Extremities: Extremities normal, chronic appearing ulcer on the base of the great toe with minimal serosanguineous discharge. Pulses: 2+ and symmetric all extremities. Skin: Skin color, texture, turgor normal. No rashes or lesions   Neurologic: CNII-XII intact. Decreased sensations bilateral lower extremity               Data Review:     Recent Days:  Recent Labs     05/11/20  1454   WBC 14.9*   HGB 13.4   HCT 41.9        Recent Labs     05/11/20  1454   *   K 3.8   CL 99   CO2 28   *   BUN 13   CREA 0.94   CA 9.1   ALB 3.8   SGOT 16   ALT 31     No results for input(s): PH, PCO2, PO2, HCO3, FIO2 in the last 72 hours. 24 Hour Results:  Recent Results (from the past 24 hour(s))   CBC WITH AUTOMATED DIFF    Collection Time: 05/11/20  2:54 PM   Result Value Ref Range    WBC 14.9 (H) 4.1 - 11.1 K/uL    RBC 4.82 4.10 - 5.70 M/uL    HGB 13.4 12.1 - 17.0 g/dL    HCT 41.9 36.6 - 50.3 %    MCV 86.9 80.0 - 99.0 FL    MCH 27.8 26.0 - 34.0 PG    MCHC 32.0 30.0 - 36.5 g/dL    RDW 13.2 11.5 - 14.5 %    PLATELET 619 100 - 638 K/uL    MPV 11.1 8.9 - 12.9 FL    NRBC 0.0 0  WBC    ABSOLUTE NRBC 0.00 0.00 - 0.01 K/uL    NEUTROPHILS 81 (H) 32 - 75 %    LYMPHOCYTES 9 (L) 12 - 49 %    MONOCYTES 9 5 - 13 %    EOSINOPHILS 0 0 - 7 %    BASOPHILS 0 0 - 1 %    IMMATURE GRANULOCYTES 1 (H) 0.0 - 0.5 %    ABS. NEUTROPHILS 12.0 (H) 1.8 - 8.0 K/UL    ABS. LYMPHOCYTES 1.4 0.8 - 3.5 K/UL    ABS. MONOCYTES 1.4 (H) 0.0 - 1.0 K/UL    ABS. EOSINOPHILS 0.0 0.0 - 0.4 K/UL    ABS. BASOPHILS 0.1 0.0 - 0.1 K/UL    ABS. IMM.  GRANS. 0.1 (H) 0.00 - 0.04 K/UL    DF AUTOMATED     METABOLIC PANEL, COMPREHENSIVE    Collection Time: 05/11/20  2:54 PM   Result Value Ref Range    Sodium 132 (L) 136 - 145 mmol/L    Potassium 3.8 3.5 - 5.1 mmol/L    Chloride 99 97 - 108 mmol/L    CO2 28 21 - 32 mmol/L    Anion gap 5 5 - 15 mmol/L    Glucose 257 (H) 65 - 100 mg/dL BUN 13 6 - 20 MG/DL    Creatinine 0.94 0.70 - 1.30 MG/DL    BUN/Creatinine ratio 14 12 - 20      GFR est AA >60 >60 ml/min/1.73m2    GFR est non-AA >60 >60 ml/min/1.73m2    Calcium 9.1 8.5 - 10.1 MG/DL    Bilirubin, total 0.6 0.2 - 1.0 MG/DL    ALT (SGPT) 31 12 - 78 U/L    AST (SGOT) 16 15 - 37 U/L    Alk. phosphatase 108 45 - 117 U/L    Protein, total 8.0 6.4 - 8.2 g/dL    Albumin 3.8 3.5 - 5.0 g/dL    Globulin 4.2 (H) 2.0 - 4.0 g/dL    A-G Ratio 0.9 (L) 1.1 - 2.2     LACTIC ACID    Collection Time: 05/11/20  2:54 PM   Result Value Ref Range    Lactic acid 2.2 (HH) 0.4 - 2.0 MMOL/L   SAMPLES BEING HELD    Collection Time: 05/11/20  2:54 PM   Result Value Ref Range    SAMPLES BEING HELD 1RED,1BLU     COMMENT        Add-on orders for these samples will be processed based on acceptable specimen integrity and analyte stability, which may vary by analyte. CULTURE, WOUND [place] STAIN    Collection Time: 05/11/20  5:30 PM   Result Value Ref Range    Special Requests: NO SPECIAL REQUESTS      GRAM STAIN RARE WBCS SEEN      GRAM STAIN 3+ GRAM POSITIVE COCCI IN PAIRS      Culture result: PENDING    LACTIC ACID    Collection Time: 05/11/20  7:35 PM   Result Value Ref Range    Lactic acid 1.8 0.4 - 2.0 MMOL/L         Imaging:     Assessment:     Plan:     1. Sepsis: Likely secondary to diabetic foot infection, start patient on broad-spectrum IV antibiotics, wound and blood cultures, IV hydration, lactate every 4 hours, supportive care and close monitoring, podiatry consult, venous duplex bilateral lower extremity, antipyretics, telemetry monitoring and further intervention per hospital course continue to closely monitor and reassess as needed. Wound care consult. May consider getting an MRI to rule out osteomyelitis  2. Diabetes mellitus type 2: Poorly controlled sliding scale NovoLog insulin, Accu-Cheks, supportive care, diabetic diet, close monitoring  3.   Hypertension: we will hold hydrochlorothiazide for now, continue lisinopril, continue to monitor  4. Hyperlipidemia: Continue home medications and continue to monitor   5.   GI DVT prophylaxis patient will be on heparin                   Signed By: Casimiro Ross MD     May 11, 2020

## 2020-05-12 NOTE — ACP (ADVANCE CARE PLANNING)
Advance Care Planning     Advance Care Planning Clinical Specialist  Conversation Note      Date of ACP Conversation: 5/11/2020    Conversation Conducted with:   Patient with Decision Making Capacity    ACP Clinical Specialist: [id number] Mahi Decision Maker:    Current Designated Health Care Decision Maker:   Primary Decision Maker: Ry Altamirano - Child - [phone]    Secondary Decision Maker: Lulu Arnett - Daughter - [phone]      Care Preferences      Ventilation: \"If you were in your present state of health and suddenly became very ill and were unable to breathe on your own, what would your preference be about the use of a ventilator (breathing machine) if it were available to you? \"      If patient would desire the use of a ventilator (breathing machine), answer \"yes\", if not \"no\":yes    \"If your health worsens and it becomes clear that your chance of recovery is unlikely, what would your preference be about the use of a ventilator (breathing machine) if it were available to you? \"     If patient would desire the use of a ventilator (breathing machine), answer \"yes\", if not \"no\"NO      Resuscitation  \"CPR works best to restart the heart when there is a sudden event, like a heart attack, in someone who is otherwise healthy. Unfortunately, CPR does not typically restart the heart for people who have serious health conditions or who are very sick. \"    \"In the event your heart stopped as a result of an underlying serious health condition, would you want attempts to be made to restart your heart (answer \"yes\" for attempt to resuscitate) or would you prefer a natural death (answer \"no\" for do not attempt to resuscitate)? \" yes        [] Yes  [x] No   Educated Patient / Carri Wilson regarding differences between Advance Directives and portable DNR orders.     Length of ACP Conversation in minutes:      Conversation Outcomes:  [x] ACP discussion completed  [] Existing advance directive reviewed with patient; no changes to patient's previously recorded wishes   [] New Advance Directive completed   [] Portable Do Not Rescitate prepared for Provider review and signature  [] POLST/POST/MOLST/MOST prepared for Provider review and signature      Follow-up plan:    [x] Schedule follow-up conversation to continue planning  [] Referred individual to Provider for additional questions/concerns   [] Advised patient/agent/surrogate to review completed ACP document and update if needed with changes in condition, patient preferences or care setting     [x] This note routed to one or more involved healthcare providers       [facility]

## 2020-05-12 NOTE — WOUND CARE
WOCN Note:  
 
 
New consult placed RN for foot wound. Patient assessed in room 210: Scooter R/O: wore goggles, mask, gown and gloves. Patient on a [place] Frame Bed. Chart shows: 
Admitted for sepsis. PMH: DM with right foot ulcer for 4-6 weeks and with blistering, right groin abscess drainad, elevated cholesterol and HTN. WBC = 14.9 On = 5-11-20 Admitted from: home to ER with upper thigh pain, fever, n/v and chills. Assessment:  Patient stating, \" I have neuropathy on my feet. \" 
Patient is A&O x 3,  communicative, continent and mobile. Independent in turning and repositioning. Continent no bryant. Diet: DM consistant carb Patient reports pain when touching center of wound 5/10. Bilateral heels, buttocks and sacral skin intact and without erythema. Heels offloaded on pillow. Palpable DP pulses bilaterally. 1. POA: right foot plantar callus. . Scant ser/sang drainage on dressing removed. Podiatrist Dr. Vigil Side following patient but not sure if  has privleges here. Callus: 4.0cm x 3.0cm x 0.1cm / dark thick brown skin with tenderness at center of wound. Callus is raised from skin. 2.5cm of induration around and erythema on amy wound skin at 3 o'clock extending 7.0cm. Verbal consent given for wound photography. Media image populated from existing file in chart. Right Plantar foot large callus. Documentation above. Erhythema extending on amy wound skin noted. Patient repositioned self independently. Recommendations:   
- every other day: clean with NS, apply Optiform AG cut to size of wound, dry 4x4 and tim till assessed by Podiatry. Minimize layers of linen/pads under patient to optimize support surface. Turn/reposition approximately every 2 hours and offload heels. Patient is continent. incontinence pad. Please call [facility] if not delivered. Discussed above plan with patient and Maverick Hightower. Transition of Care: Plan to follow weekly and as needed while admitted to hospital.   
 
Myranda RIOS RN Wound Care Department Office: [phone] Pager: [phone]

## 2020-05-12 NOTE — PROGRESS NOTES
Pharmacist Note - Vancomycin Dosing    Consult provided for this 53 y.o. male for indication of leg cellulitis. Antibiotic regimen(s): vancomycin and cefepime  Patient on vancomycin PTA? NO     Recent Labs     20  1454   WBC 14.9*   CREA 0.94   BUN 13     Frequency of BMP: daily  Height: 188 cm  Weight: 122.2 kg  Est CrCl: >100 ml/min  Temp (24hrs), Av.3 °F (37.4 °C), Min:98 °F (36.7 °C), Max:101 °F (38.3 °C)    Cultures:   blood   wound    Goal trough = 10 - 15 mcg/mL    Therapy will be initiated with a loading dose of 2000 mg IV x 1 to be followed by a maintenance dose of 1750 mg IV every 12 hours. Pharmacy to follow patient daily and order levels / make dose adjustments as appropriate.

## 2020-05-12 NOTE — PROGRESS NOTES
Patient arrived to 5E around 6:15pm. RN noted outstanding Podiatry consult and was told in report from 2N that Podiatry still has not seen patient. Wound Care note stated that patient is followed by Dr. Kayode Marinelli, therefore RN placed page to on-call physician with Dr. Bebe donohue. Was told Dr. Chase Gracia would be returning call. 2030 - At this time, have not yet spoken to Podiatry. RN passed this information on to night shift RN for further follow up tomorrow.

## 2020-05-12 NOTE — PROGRESS NOTES
Hospitalist Progress Note  Ximena Black MD  Answering service: [phone] OR [phone] from in house phone      Date of Service:  2020  NAME:  Madonna Alicea  :  1965  MRN:  [id number]      Admission Summary:   The patient is a 50-year-old gentleman with past medical history as below who presents to the hospital with Right leg pain. Patient reports that he pain in his right leg. Reports that he has abscess and a blister on his right foot that has been there for at least 4 to 6 weeks. Patient reports that he has been seeing a podiatrist who is been \"cutting dead skin around the blister\". Patient reports that he started having some upper thigh pain today on the right leg associated with some nausea vomiting and feeling hot along with chills. Interval history / Subjective:      Patient seen and examined this morning. Patient has no new complaints. He denied any respiratory symptoms including cough, sob, sore throat or any GI symptoms. Wound doesn't look that infected even though it is covered with clean dressing. Assessment & Plan:     # Sepsis: Likely secondary to diabetic foot infection  - follow wound and blood culture   - on IV Cefepime and IV vancomycin   - venous duplex bilateral lower extremity  - podiatry and wound care consulted  - X-ray negative for osteo, MRI pending     # COVID tested on admission- Negative     # DM type 2:   - SSI, accuchek and diabetic diet     # Hypertension:  - hold Hctz and ct with Lisinopril     # Hyperlipidemia: Continue home medications and continue to monitor     DVT prop: on Heparin      Hospital Problems  Date Reviewed: 4/3/2020          Codes Class Noted POA    Sepsis ([facility] ICD-10-CM: A41.9  ICD-9-CM: 038.9, 995.91  2020 Unknown                Review of Systems:   Pertinent positive mentioned in interval hx/HPI.  Other systems reviewed and negative     Vital Signs:    Last 24hrs VS reviewed since prior progress note. Most recent are:  Visit Vitals  /84 (BP 1 Location: Right arm, BP Patient Position: Sitting)   Pulse 84   Temp 98 °F (36.7 °C)   Resp 15   Ht 6' 2.02\" (1.88 m)   Wt 122.2 kg (269 lb 8 oz)   SpO2 97%   BMI 34.59 kg/m²         Intake/Output Summary (Last 24 hours) at 5/12/2020 9289  Last data filed at 5/12/2020 0533  Gross per 24 hour   Intake 2000 ml   Output 500 ml   Net 1500 ml        Physical Examination:             Constitutional:  No acute distress, cooperative, pleasant    ENT:  Oral mucous moist, oropharynx benign. Neck supple,    Resp:  CTA bilaterally. No wheezing/rhonchi/rales. No accessory muscle use   CV:  Regular rhythm, normal rate, no murmurs, gallops, rubs    GI:  Soft, non distended, non tender. normoactive bowel sounds, no hepatosplenomegaly     Musculoskeletal:  right foot covered with clean dressing     Neurologic:  Moves all extremities. AAOx3, CN II-XII reviewed               Data Review:     I personally reviewed labs and imaging     Labs:     Recent Labs     05/12/20  0353 05/11/20  1454   WBC 9.4 14.9*   HGB 11.6* 13.4   HCT 36.2* 41.9    199     Recent Labs     05/12/20  0353 05/11/20  1454    132*   K 3.6 3.8    99   CO2 25 28   BUN 11 13   CREA 0.80 0.94   * 257*   CA 7.8* 9.1     Recent Labs     05/11/20  1454   SGOT 16   ALT 31      TBILI 0.6   TP 8.0   ALB 3.8   GLOB 4.2*     No results for input(s): INR, PTP, APTT, INREXT in the last 72 hours. Recent Labs     05/11/20  1454   FERR 285      No results found for: FOL, RBCF   No results for input(s): PH, PCO2, PO2 in the last 72 hours.   Recent Labs     05/12/20  0353   TROIQ <0.05     No results found for: CHOL, CHOLX, CHLST, CHOLV, HDL, HDLP, LDL, LDLC, DLDLP, TGLX, TRIGL, TRIGP, CHHD, CHHDX  Lab Results   Component Value Date/Time    Glucose (POC) 186 (H) 05/12/2020 07:35 AM     Lab Results   Component Value Date/Time    Color YELLOW/STRAW 05/11/2020 09:58 PM Appearance CLEAR 05/11/2020 09:58 PM    Specific gravity 1.015 05/11/2020 09:58 PM    pH (UA) 5.5 05/11/2020 09:58 PM    Protein Negative 05/11/2020 09:58 PM    Glucose 250 (A) 05/11/2020 09:58 PM    Ketone Negative 05/11/2020 09:58 PM    Bilirubin Negative 05/11/2020 09:58 PM    Urobilinogen 1.0 05/11/2020 09:58 PM    Nitrites Negative 05/11/2020 09:58 PM    Leukocyte Esterase Negative 05/11/2020 09:58 PM         Medications Reviewed:     Current Facility-Administered Medications   Medication Dose Route Frequency    . PHARMACY TO SUBSTITUTE PER PROTOCOL (Reordered from: atorvastatin calcium (ATORVASTATIN PO))    Per Protocol    DULoxetine (CYMBALTA) capsule 30 mg  30 mg Oral DAILY    gabapentin (NEURONTIN) tablet 300 mg  300 mg Oral BID    lisinopriL (PRINIVIL, ZESTRIL) tablet 10 mg  10 mg Oral DAILY    sodium chloride (NS) flush 5-40 mL  5-40 mL IntraVENous Q8H    sodium chloride (NS) flush 5-40 mL  5-40 mL IntraVENous PRN    0.9% sodium chloride infusion  75 mL/hr IntraVENous CONTINUOUS    acetaminophen (TYLENOL) tablet 650 mg  650 mg Oral Q4H PRN    heparin (porcine) injection 5,000 Units  5,000 Units SubCUTAneous Q8H    cefepime (MAXIPIME) 2 g in 0.9% sodium chloride (MBP/ADV) 100 mL  2 g IntraVENous Q8H    vancomycin (VANCOCIN) 1750 mg in  ml infusion  1,750 mg IntraVENous Q12H    sodium chloride (NS) flush 5-10 mL  5-10 mL IntraVENous PRN    acetaminophen (TYLENOL) tablet 650 mg  650 mg Oral Q6H PRN    Or    acetaminophen (TYLENOL) suppository 650 mg  650 mg Rectal Q6H PRN    glucose chewable tablet 16 g  4 Tab Oral PRN    glucagon (GLUCAGEN) injection 1 mg  1 mg IntraMUSCular PRN    dextrose 10% infusion 0-250 mL  0-250 mL IntraVENous PRN    Vancomycin Pharmacy to Dose   Other Rx Dosing/Monitoring    sodium chloride (NS) flush 5-10 mL  5-10 mL IntraVENous PRN     ______________________________________________________________________  EXPECTED LENGTH OF STAY: - - -  ACTUAL LENGTH OF STAY:          1                 Yoana Vale MD   Patient has given Verbal permission to discuss medical care with   persons present in the room and and also with contact as listed on face sheet.

## 2020-05-12 NOTE — PROGRESS NOTES
Spoke to the RN, patient to receive IV fluids per sepsis protocol, no weight in the chart, the RN will order fluids per sepsis protocol

## 2020-05-12 NOTE — ED NOTES
TRANSFER - OUT REPORT:    Verbal report given to RN(name) on Judy Zuleta  being transferred to (unit) for routine progression of care       Report consisted of patients Situation, Background, Assessment and   Recommendations(SBAR). Information from the following report(s) ED Summary was reviewed with the receiving nurse. Lines:   Peripheral IV 05/11/20 Left Antecubital (Active)   Site Assessment Clean, dry, & intact 5/11/2020  2:59 PM   Phlebitis Assessment 0 5/11/2020  2:59 PM   Infiltration Assessment 0 5/11/2020  2:59 PM   Dressing Status Clean, dry, & intact 5/11/2020  2:59 PM   Dressing Type Tape;Transparent 5/11/2020  2:59 PM   Hub Color/Line Status Pink;Capped;Flushed;Patent 5/11/2020  2:59 PM   Action Taken Blood drawn 5/11/2020  2:59 PM       Peripheral IV 05/11/20 Right Antecubital (Active)   Site Assessment Clean, dry, & intact 5/11/2020  3:00 PM   Phlebitis Assessment 0 5/11/2020  3:00 PM   Infiltration Assessment 0 5/11/2020  3:00 PM   Dressing Status Clean, dry, & intact 5/11/2020  3:00 PM   Dressing Type Tape;Transparent 5/11/2020  3:00 PM   Hub Color/Line Status Pink;Capped; Patent; Flushed 5/11/2020  3:00 PM   Action Taken Blood drawn 5/11/2020  3:00 PM        Opportunity for questions and clarification was provided.       Patient transported with:   [employer]

## 2020-05-12 NOTE — PROGRESS NOTES
Pharmacist Admission Medication Reconciliation:    I reviewed Mr Felix's PTA medication over the phone due to isolation status. He knows the names of his medications, and is certain of how he takes the gabapentin, but is a little uncertain of the other doses and frequencies. I called [address] [place] [address] to confirm Cymbalta and gabapentin doses with pharmacist Nellie Sesay. Other doses were obtained from recent Rx Query prescriptions at [address] [place] [address]. Rx Query data available? ¹ YES (some, not all)  Reviewed active and held orders. YES - contacting MD to correct inpatient orders. Medication changes/notes:  Updated gabapentin dose, and confirmed it with pharmacist Juliana Rodas at [address] [place] [address]). For glimepiride and metformin, patient thinks he is taking twice a day but prescribed as only once per day. Since he didn't sound certain, I have put once daily on PTA list per outpatient pharmacy. Uncertain if patient is compliant w/ Lipitor, as rest of the meds have been filled recently but the Lipitor was last filled in January. Thank you for allowing me to participate in this patient's care. Please call [phone] or [phone] with any questions. Kam Glover, Pharmacist          [facility] pharmacy benefit data reflects medications filled and processed through the patient's insurance,   however this data does NOT capture whether the medication was picked up or is currently being taken by the patient. Prior to Admission Medications:   Prior to Admission Medications   Prescriptions Last Dose Informant Taking? DULoxetine (CYMBALTA) 30 mg capsule   Yes   Sig: Take 1 Cap by mouth daily. atorvastatin (LIPITOR) 20 mg tablet   Yes   Sig: Take 20 mg by mouth daily. gabapentin (NEURONTIN) 600 mg tablet   Yes   Sig: Take 1,200 mg by mouth daily. 600 mg tablets - 2 tablets every morning and 4 tablets every evening.   gabapentin (NEURONTIN) 600 mg tablet   Yes   Sig: Take 2,400 mg by mouth every evening.  600 mg tablets - 2 tablets every morning and 4 tablets every evening. glimepiride (AMARYL) 4 mg tablet   Yes   Sig: Take 4 mg by mouth every morning. hydroCHLOROthiazide (HYDRODIURIL) 25 mg tablet   Yes   Sig: Take 25 mg by mouth daily. lisinopriL (PRINIVIL, ZESTRIL) 20 mg tablet   Yes   Sig: Take 20 mg by mouth daily. metFORMIN ER (GLUCOPHAGE XR) 750 mg tablet   Yes   Sig: Take 750 mg by mouth daily.         Facility-Administered Medications: None

## 2020-05-12 NOTE — PROGRESS NOTES
TRANSFER - OUT REPORT:    Verbal report given to Lainey Burger RN(name) on Jaki Jimenez  being transferred to [facility](unit) for routine progression of care       Report consisted of patients Situation, Background, Assessment and   Recommendations(SBAR). Information from the following report(s) SBAR, Kardex, Intake/Output, MAR, Accordion and Recent Results was reviewed with the receiving nurse. Lines:   Peripheral IV 05/11/20 Left Antecubital (Active)   Site Assessment Clean, dry, & intact 5/12/2020  5:05 PM   Phlebitis Assessment 0 5/12/2020  5:05 PM   Infiltration Assessment 0 5/12/2020  5:05 PM   Dressing Status Clean, dry, & intact 5/12/2020  5:05 PM   Dressing Type Transparent 5/12/2020  5:05 PM   Hub Color/Line Status Infusing 5/12/2020  5:05 PM   Action Taken Blood drawn 5/11/2020  2:59 PM   Alcohol Cap Used Yes 5/12/2020  5:05 PM       Peripheral IV 05/11/20 Right Antecubital (Active)   Site Assessment Clean, dry, & intact 5/12/2020  5:05 PM   Phlebitis Assessment 0 5/12/2020  5:05 PM   Infiltration Assessment 0 5/12/2020  5:05 PM   Dressing Status Clean, dry, & intact 5/12/2020  5:05 PM   Dressing Type Transparent 5/12/2020  5:05 PM   Hub Color/Line Status Capped 5/12/2020  5:05 PM   Action Taken Blood drawn 5/11/2020  3:00 PM   Alcohol Cap Used Yes 5/12/2020  5:05 PM        Opportunity for questions and clarification was provided.       Patient transported with:   transport services

## 2020-05-12 NOTE — PROGRESS NOTES
CHRISTIANO:  1. COVID result pending. 2. Return home when stable. 3. Wound care consult for foot wound. Care Management Interventions  PCP Verified by CM: Yes  Palliative Care Criteria Met (RRAT>21 & CHF Dx)?: No  Mode of Transport at Discharge: Other (see comment)  MyChart Signup: No  Discharge Durable Medical Equipment: No  Health Maintenance Reviewed: Yes  Physical Therapy Consult: No  Occupational Therapy Consult: No  Speech Therapy Consult: No  Current Support Network: Own Home  Confirm Follow Up Transport: Family  The Plan for Transition of Care is Related to the Following Treatment Goals : Return home when stable. The Patient and/or Patient Representative was Provided with a Choice of Provider and Agrees with the Discharge Plan?: Yes   Resource Information Provided?: No  Discharge Location  Discharge Placement: Home with family assistance    Reason for Admission:   Sepsis, foot wound, covid result pending. RUR Score:          11%           Plan for utilizing home health:      No indication at this time. PCP: First and Last name:  Dr. Larance Cockayne   Name of Practice: [facility]   Are you a current patient: Yes/No: Yes   Approximate date of last visit: Patient was schedule for tomorrow appointment, he will need f/u appt. Current Advanced Directive/Advance Care Plan: Not on file, patient interested in reviewing AMD. He stated his two children are NOK. Transition of Care Plan:                      Reviewed chart for transitions of care,and discussed in rounds. CM met with patient at bedside to explain role and offer support. Patient is alert and oriented x4, and confirmed demographics. He lives in his private home with his mother and assist with taking care of her. 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 He plans to return home when stable.  His preferred pharmacy is [facility]. He requested for CM to cancel his PCP appointment for tomorrow. CM to follow for any needs that may arise.     Rhiannon Lui, [facility]

## 2020-05-13 ENCOUNTER — APPOINTMENT (OUTPATIENT)
Dept: MRI IMAGING | Age: 55
DRG: 854 | End: 2020-05-13
Attending: PODIATRIST
Payer: SELF-PAY

## 2020-05-13 ENCOUNTER — APPOINTMENT (OUTPATIENT)
Dept: VASCULAR SURGERY | Age: 55
DRG: 854 | End: 2020-05-13
Attending: FAMILY MEDICINE
Payer: SELF-PAY

## 2020-05-13 LAB
ANION GAP SERPL CALC-SCNC: 6 MMOL/L (ref 5–15)
APTT PPP: 27.1 SEC (ref 22.1–32)
BUN SERPL-MCNC: 9 MG/DL (ref 6–20)
BUN/CREAT SERPL: 13 (ref 12–20)
CALCIUM SERPL-MCNC: 8.3 MG/DL (ref 8.5–10.1)
CHLORIDE SERPL-SCNC: 107 MMOL/L (ref 97–108)
CO2 SERPL-SCNC: 25 MMOL/L (ref 21–32)
COMMENT, HOLDF: NORMAL
CREAT SERPL-MCNC: 0.72 MG/DL (ref 0.7–1.3)
D DIMER PPP FEU-MCNC: 0.49 MG/L FEU (ref 0–0.65)
FIBRINOGEN PPP-MCNC: 556 MG/DL (ref 200–475)
GLUCOSE BLD STRIP.AUTO-MCNC: 182 MG/DL (ref 65–100)
GLUCOSE BLD STRIP.AUTO-MCNC: 205 MG/DL (ref 65–100)
GLUCOSE BLD STRIP.AUTO-MCNC: 219 MG/DL (ref 65–100)
GLUCOSE BLD STRIP.AUTO-MCNC: 259 MG/DL (ref 65–100)
GLUCOSE SERPL-MCNC: 174 MG/DL (ref 65–100)
INR PPP: 1 (ref 0.9–1.1)
POTASSIUM SERPL-SCNC: 3.5 MMOL/L (ref 3.5–5.1)
PROTHROMBIN TIME: 10.5 SEC (ref 9–11.1)
SAMPLES BEING HELD,HOLD: NORMAL
SERVICE CMNT-IMP: ABNORMAL
SODIUM SERPL-SCNC: 138 MMOL/L (ref 136–145)
THERAPEUTIC RANGE,PTTT: NORMAL SECS (ref 58–77)

## 2020-05-13 PROCEDURE — 80048 BASIC METABOLIC PNL TOTAL CA: CPT

## 2020-05-13 PROCEDURE — 85610 PROTHROMBIN TIME: CPT

## 2020-05-13 PROCEDURE — 82962 GLUCOSE BLOOD TEST: CPT

## 2020-05-13 PROCEDURE — 74011636637 HC RX REV CODE- 636/637: Performed by: FAMILY MEDICINE

## 2020-05-13 PROCEDURE — 73720 MRI LWR EXTREMITY W/O&W/DYE: CPT

## 2020-05-13 PROCEDURE — 36415 COLL VENOUS BLD VENIPUNCTURE: CPT

## 2020-05-13 PROCEDURE — 74011000258 HC RX REV CODE- 258: Performed by: FAMILY MEDICINE

## 2020-05-13 PROCEDURE — 74011636637 HC RX REV CODE- 636/637: Performed by: INTERNAL MEDICINE

## 2020-05-13 PROCEDURE — A9575 INJ GADOTERATE MEGLUMI 0.1ML: HCPCS | Performed by: FAMILY MEDICINE

## 2020-05-13 PROCEDURE — 85384 FIBRINOGEN ACTIVITY: CPT

## 2020-05-13 PROCEDURE — 74011250637 HC RX REV CODE- 250/637: Performed by: FAMILY MEDICINE

## 2020-05-13 PROCEDURE — 65270000032 HC RM SEMIPRIVATE

## 2020-05-13 PROCEDURE — 85379 FIBRIN DEGRADATION QUANT: CPT

## 2020-05-13 PROCEDURE — 74011250637 HC RX REV CODE- 250/637: Performed by: INTERNAL MEDICINE

## 2020-05-13 PROCEDURE — 93970 EXTREMITY STUDY: CPT

## 2020-05-13 PROCEDURE — 74011250636 HC RX REV CODE- 250/636: Performed by: FAMILY MEDICINE

## 2020-05-13 PROCEDURE — 94760 N-INVAS EAR/PLS OXIMETRY 1: CPT

## 2020-05-13 PROCEDURE — 85730 THROMBOPLASTIN TIME PARTIAL: CPT

## 2020-05-13 RX ORDER — HEPARIN SODIUM 5000 [USP'U]/ML
5000 INJECTION, SOLUTION INTRAVENOUS; SUBCUTANEOUS EVERY 12 HOURS
Status: DISCONTINUED | OUTPATIENT
Start: 2020-05-14 | End: 2020-05-15 | Stop reason: HOSPADM

## 2020-05-13 RX ORDER — INSULIN GLARGINE 100 [IU]/ML
10 INJECTION, SOLUTION SUBCUTANEOUS
Status: DISCONTINUED | OUTPATIENT
Start: 2020-05-13 | End: 2020-05-15

## 2020-05-13 RX ORDER — SODIUM CHLORIDE 0.9 % (FLUSH) 0.9 %
10 SYRINGE (ML) INJECTION
Status: COMPLETED | OUTPATIENT
Start: 2020-05-13 | End: 2020-05-13

## 2020-05-13 RX ORDER — GADOTERATE MEGLUMINE 376.9 MG/ML
20 INJECTION INTRAVENOUS
Status: COMPLETED | OUTPATIENT
Start: 2020-05-13 | End: 2020-05-13

## 2020-05-13 RX ADMIN — GABAPENTIN 1200 MG: 600 TABLET, FILM COATED ORAL at 06:47

## 2020-05-13 RX ADMIN — ACETAMINOPHEN 650 MG: 325 TABLET, FILM COATED ORAL at 15:20

## 2020-05-13 RX ADMIN — DULOXETINE 30 MG: 30 CAPSULE, DELAYED RELEASE ORAL at 09:16

## 2020-05-13 RX ADMIN — Medication 10 ML: at 17:10

## 2020-05-13 RX ADMIN — GABAPENTIN 2400 MG: 600 TABLET, FILM COATED ORAL at 21:31

## 2020-05-13 RX ADMIN — HEPARIN SODIUM 5000 UNITS: 5000 INJECTION, SOLUTION INTRAVENOUS; SUBCUTANEOUS at 12:08

## 2020-05-13 RX ADMIN — LISINOPRIL 20 MG: 20 TABLET ORAL at 09:16

## 2020-05-13 RX ADMIN — Medication 10 ML: at 06:48

## 2020-05-13 RX ADMIN — VANCOMYCIN HYDROCHLORIDE 1750 MG: 10 INJECTION, POWDER, LYOPHILIZED, FOR SOLUTION INTRAVENOUS at 12:45

## 2020-05-13 RX ADMIN — INSULIN LISPRO 3 UNITS: 100 INJECTION, SOLUTION INTRAVENOUS; SUBCUTANEOUS at 17:56

## 2020-05-13 RX ADMIN — ATORVASTATIN CALCIUM 20 MG: 20 TABLET, FILM COATED ORAL at 09:16

## 2020-05-13 RX ADMIN — Medication 10 ML: at 12:11

## 2020-05-13 RX ADMIN — Medication 10 ML: at 21:31

## 2020-05-13 RX ADMIN — CEFEPIME HYDROCHLORIDE 2 G: 2 INJECTION, POWDER, FOR SOLUTION INTRAVENOUS at 12:08

## 2020-05-13 RX ADMIN — INSULIN LISPRO 3 UNITS: 100 INJECTION, SOLUTION INTRAVENOUS; SUBCUTANEOUS at 21:30

## 2020-05-13 RX ADMIN — INSULIN LISPRO 2 UNITS: 100 INJECTION, SOLUTION INTRAVENOUS; SUBCUTANEOUS at 06:48

## 2020-05-13 RX ADMIN — INSULIN GLARGINE 12 UNITS: 100 INJECTION, SOLUTION SUBCUTANEOUS at 09:15

## 2020-05-13 RX ADMIN — VANCOMYCIN HYDROCHLORIDE 1750 MG: 10 INJECTION, POWDER, LYOPHILIZED, FOR SOLUTION INTRAVENOUS at 00:14

## 2020-05-13 RX ADMIN — INSULIN LISPRO 3 UNITS: 100 INJECTION, SOLUTION INTRAVENOUS; SUBCUTANEOUS at 12:07

## 2020-05-13 RX ADMIN — CEFEPIME HYDROCHLORIDE 2 G: 2 INJECTION, POWDER, FOR SOLUTION INTRAVENOUS at 03:29

## 2020-05-13 RX ADMIN — GADOTERATE MEGLUMINE 20 ML: 376.9 INJECTION INTRAVENOUS at 17:09

## 2020-05-13 RX ADMIN — INSULIN GLARGINE 10 UNITS: 100 INJECTION, SOLUTION SUBCUTANEOUS at 21:30

## 2020-05-13 RX ADMIN — HEPARIN SODIUM 5000 UNITS: 5000 INJECTION, SOLUTION INTRAVENOUS; SUBCUTANEOUS at 03:35

## 2020-05-13 RX ADMIN — CEFEPIME HYDROCHLORIDE 2 G: 2 INJECTION, POWDER, FOR SOLUTION INTRAVENOUS at 19:19

## 2020-05-13 RX ADMIN — ACETAMINOPHEN 650 MG: 325 TABLET, FILM COATED ORAL at 03:33

## 2020-05-13 NOTE — PROGRESS NOTES
Did not hear back from on call physician for podiatry. Spoke with  who stated she would call Dr. Rosa Elena Beth. Bedside and Verbal shift change report given to Golden Salazar (oncoming nurse) by Agustina Farias RN (offgoing nurse). Report included the following information SBAR and Kardex.

## 2020-05-13 NOTE — CONSULTS
Consult dictated    Patient known to me from the office where he has been seen for right 1st & 5th MPJ ulcers with poorly controlled DM II and significant peripheral neuropathy. Patient has not followed up for his regular wound appointments for over a month. The 5th MPJ ulcer is healed. Unfortunately, approximately  5 cc of mixed purulent drainage and phlegm was expressed from the plantar 1st met head ulceration today and the wound probes to capsule. Will order MRI to assess for underlying abscess and early osteomyeliltis. Discussed this with patient. Patient discussed with Dr. Zayra Washington.

## 2020-05-13 NOTE — PROGRESS NOTES
Hospitalist Progress Note  Ori Gaytan MD  Answering service: [phone] OR [phone] from in house phone      Date of Service:  2020  NAME:  Juni Boone  :  1965  MRN:  [id number]      Admission Summary:   The patient is a 59-year-old gentleman with past medical history as below who presents to the hospital with Right leg pain. Patient reports that he pain in his right leg. Reports that he has abscess and a blister on his right foot that has been there for at least 4 to 6 weeks. Patient reports that he has been seeing a podiatrist who is been \"cutting dead skin around the blister\". Patient reports that he started having some upper thigh pain today on the right leg associated with some nausea vomiting and feeling hot along with chills. Interval history / Subjective:      Patient seen and examined this morning. Patient has no new complaints. He denied any respiratory symptoms including cough, sob, sore throat or any GI symptoms.    Wound looks better based on comparison to wound care pics taken yesterday  Called Dr Tony Crouch office to speak with her    Assessment & Plan:     # Sepsis: due to non healing to diabetic ulcer w/ cellulitis of R foot   - follow wound and blood culture   - on IV Cefepime and IV vancomycin   - venous duplex bilateral lower extremity neg for DVT  - podiatry and wound care consulted  - X-ray negative for osteo    # COVID tested on admission- Negative     # DM type 2:  HbA1c > 10; he needs to be on insulin  Starting lantus with ss insulin now and will plan for insulin to be scheduled with meals TID soon -diabetic diet     # Hypertension:  -stopped IVFluids  resume Hctz and  Lisinopril as tolerated  JEANNINE castellanos suggest patient has pulmonary hypertension  Consider ECHO in the outpatient setting and avoid excessive fluids while inpatient     # Hyperlipidemia: Continue home medications and continue to monitor  Peripheral neuropathy- cont gabapentin  Psych- stable on home meds     DVT prop: on Heparin      Hospital Problems  Date Reviewed: 4/3/2020          Codes Class Noted POA    Sepsis ([facility] [address] ICD-10-CM: A41.9  ICD-9-CM: 038.9, 995.91  5/11/2020 Unknown                Review of Systems:   Pertinent positive mentioned in interval hx/HPI. Other systems reviewed and negative     Vital Signs:    Last 24hrs VS reviewed since prior progress note. Most recent are:  Visit Vitals  /81 (BP 1 Location: Right arm, BP Patient Position: At rest)   Pulse 79   Temp 98.3 °F (36.8 °C)   Resp 16   Ht 6' 2.02\" (1.88 m)   Wt 121.1 kg (267 lb)   SpO2 93%   BMI 34.27 kg/m²         Intake/Output Summary (Last 24 hours) at 5/13/2020 1235  Last data filed at 5/13/2020 0841  Gross per 24 hour   Intake    Output 3600 ml   Net -3600 ml        Physical Examination:             Constitutional:  No acute distress, cooperative, pleasant    ENT:  Oral mucous moist, oropharynx benign. Neck supple,    Resp:  CTA bilaterally. No wheezing/rhonchi/rales. No accessory muscle use   CV:  Regular rhythm, normal rate, no murmurs, gallops, rubs    GI:  Soft, non distended, non tender. normoactive bowel sounds, no hepatosplenomegaly     Musculoskeletal:  right foot ulcer with serosanguinous drainage- surrounding cellulitits appears resolved     Neurologic:  Moves all extremities.   AAOx3, CN II-XII reviewed, peripheral neuropathy noted               Data Review:     I personally reviewed labs and imaging     Labs:     Recent Labs     05/12/20  0353 05/11/20  1454   WBC 9.4 14.9*   HGB 11.6* 13.4   HCT 36.2* 41.9    199     Recent Labs     05/13/20  0322 05/12/20  0353 05/11/20  1454    136 132*   K 3.5 3.6 3.8    104 99   CO2 25 25 28   BUN 9 11 13   CREA 0.72 0.80 0.94   * 215* 257*   CA 8.3* 7.8* 9.1     Recent Labs     05/11/20  1454   SGOT 16   ALT 31      TBILI 0.6   TP 8.0   ALB 3.8   GLOB 4.2*     Recent Labs 05/13/20  0322 05/12/20  0353   INR 1.0 1.0   PTP 10.5 10.8   APTT 27.1 27.8      Recent Labs     05/11/20  1454   FERR 285      No results found for: FOL, RBCF   No results for input(s): PH, PCO2, PO2 in the last 72 hours.   Recent Labs     05/12/20  0353   TROIQ <0.05     No results found for: CHOL, CHOLX, CHLST, CHOLV, HDL, HDLP, LDL, LDLC, DLDLP, TGLX, TRIGL, TRIGP, CHHD, CHHDX  Lab Results   Component Value Date/Time    Glucose (POC) 205 (H) 05/13/2020 11:55 AM    Glucose (POC) 182 (H) 05/13/2020 06:36 AM    Glucose (POC) 182 (H) 05/12/2020 09:24 PM    Glucose (POC) 143 (H) 05/12/2020 04:48 PM    Glucose (POC) 243 (H) 05/12/2020 12:02 PM     Lab Results   Component Value Date/Time    Color YELLOW/STRAW 05/11/2020 09:58 PM    Appearance CLEAR 05/11/2020 09:58 PM    Specific gravity 1.015 05/11/2020 09:58 PM    pH (UA) 5.5 05/11/2020 09:58 PM    Protein Negative 05/11/2020 09:58 PM    Glucose 250 (A) 05/11/2020 09:58 PM    Ketone Negative 05/11/2020 09:58 PM    Bilirubin Negative 05/11/2020 09:58 PM    Urobilinogen 1.0 05/11/2020 09:58 PM    Nitrites Negative 05/11/2020 09:58 PM    Leukocyte Esterase Negative 05/11/2020 09:58 PM         Medications Reviewed:     Current Facility-Administered Medications   Medication Dose Route Frequency    insulin glargine (LANTUS) injection 10 Units  10 Units SubCUTAneous QHS    atorvastatin (LIPITOR) tablet 20 mg  20 mg Oral DAILY    DULoxetine (CYMBALTA) capsule 30 mg  30 mg Oral DAILY    sodium chloride (NS) flush 5-40 mL  5-40 mL IntraVENous Q8H    sodium chloride (NS) flush 5-40 mL  5-40 mL IntraVENous PRN    acetaminophen (TYLENOL) tablet 650 mg  650 mg Oral Q4H PRN    heparin (porcine) injection 5,000 Units  5,000 Units SubCUTAneous Q8H    cefepime (MAXIPIME) 2 g in 0.9% sodium chloride (MBP/ADV) 100 mL  2 g IntraVENous Q8H    vancomycin (VANCOCIN) 1750 mg in  ml infusion  1,750 mg IntraVENous Q12H    dextrose 10% infusion 0-250 mL  0-250 mL IntraVENous PRN  insulin lispro (HUMALOG) injection   SubCUTAneous AC&HS    acetaminophen (TYLENOL) tablet 650 mg  650 mg Oral Q6H PRN    Or    acetaminophen (TYLENOL) suppository 650 mg  650 mg Rectal Q6H PRN    gabapentin (NEURONTIN) tablet 2,400 mg  2,400 mg Oral QPM    lisinopriL (PRINIVIL, ZESTRIL) tablet 20 mg  20 mg Oral DAILY    gabapentin (NEURONTIN) tablet 1,200 mg  1,200 mg Oral 7am    sodium chloride (NS) flush 5-10 mL  5-10 mL IntraVENous PRN    glucose chewable tablet 16 g  4 Tab Oral PRN    glucagon (GLUCAGEN) injection 1 mg  1 mg IntraMUSCular PRN    dextrose 10% infusion 0-250 mL  0-250 mL IntraVENous PRN    Vancomycin Pharmacy to Dose   Other Rx Dosing/Monitoring    sodium chloride (NS) flush 5-10 mL  5-10 mL IntraVENous PRN     ______________________________________________________________________  EXPECTED LENGTH OF STAY: 3d 16h  ACTUAL LENGTH OF STAY:          2                 Neal Salcedo MD   Patient has given Verbal permission to discuss medical care with   persons present in the room and and also with contact as listed on face sheet.

## 2020-05-13 NOTE — PROGRESS NOTES
Bedside shift change report given to ERICA Paul (oncoming nurse) by Nikhil Kent RN (offgoing nurse). Report included the following information SBAR and Kardex.

## 2020-05-13 NOTE — CONSULTS
[address]    Name:  Thu Tam  MR#:  [id number]  :  1965  ACCOUNT #:  [de-identified]  DATE OF SERVICE:  2020    REFERRING PHYSICIAN:  Tamy Orellana MD    HISTORY OF PRESENT ILLNESS:  The patient is a 58-year-old male who is currently admitted for right lower extremity cellulitis. The patient is known to me from my office where I have previously seen him for significant painful diabetic neuropathy related to uncontrolled diabetes mellitus type 2 as well as significant ulcerations of the plantar aspect of the right first metatarsal head as well as the lateral aspect of the fifth metatarsal head. Previously, in late March and early April, I had been providing local wound care to the patient in my office. He had been compliant initially, but then began going back into wearing only just shoes instead of wearing accommodative shoe gear and then eventually stopped coming to appointments. I have not seen the patient for approximately six weeks. He came to the hospital several days ago due to some right upper thigh pain as well as fever, chill, nausea and vomiting. His white blood cell count has been coming down with IV antibiotics. He relates even though he stopped coming to appointments, he has been trying to do the dressings on his own at home. He has noted improvement in the size of his wounds.     ACTIVE PROBLEM LIST:  Patient Active Problem List   Diagnosis Code    Sepsis ([facility] [address] A41.9       PAST MEDICAL HISTORY:  Past Medical History:   Diagnosis Date    Diabetes ([facility] [address]     Hypercholesterolemia     Hypertension        PAST SURGICAL HISTORY:  Past Surgical History:   Procedure Laterality Date    HX HEENT      ear tubes       SOCIAL HISTORY:  Social History     Tobacco Use    Smoking status: Never Smoker    Smokeless tobacco: Never Used   Substance Use Topics    Alcohol use: Not on file    Drug use: Not on file       MEDICATION LIST:  No current facility-administered medications on file prior to encounter. Current Outpatient Medications on File Prior to Encounter   Medication Sig Dispense Refill    gabapentin (NEURONTIN) 600 mg tablet Take 2,400 mg by mouth every evening. 600 mg tablets - 2 tablets every morning and 4 tablets every evening.  gabapentin (NEURONTIN) 600 mg tablet Take 1,200 mg by mouth daily. 600 mg tablets - 2 tablets every morning and 4 tablets every evening.  glimepiride (AMARYL) 4 mg tablet Take 4 mg by mouth every morning.  hydroCHLOROthiazide (HYDRODIURIL) 25 mg tablet Take 25 mg by mouth daily.  lisinopriL (PRINIVIL, ZESTRIL) 20 mg tablet Take 20 mg by mouth daily.  metFORMIN ER (GLUCOPHAGE XR) 750 mg tablet Take 750 mg by mouth daily.  atorvastatin (LIPITOR) 20 mg tablet Take 20 mg by mouth daily.  DULoxetine (CYMBALTA) 30 mg capsule Take 1 Cap by mouth daily. 30 Cap 3       ALLERGIES:  Allergies   Allergen Reactions    Codeine Nausea and Vomiting     Patient states gets violently ill. REVIEW OF SYSTEMS:  A complete review of systems was performed with all pertinent findings noted above. PHYSICAL EXAMINATION:  GENERAL:  The patient is alert and oriented to person, time, and place. VITAL SIGNS:  Blood pressure 136/81, temperature 98.3, pulse 79, respirations 16, O2 93%. RESPIRATION:  The patient has normal respiratory effort. ABDOMEN:  Soft and nontender. VASCULAR:  The patient has normal palpable pulses, dorsalis pedis is 2/4 bilaterally and to the further dorsalis pedis and posterior tibial pulses. Capillary refill time is less than 3 seconds to all digits of the lower extremities. The patient does have mild erythema to the area of the first metatarsophalangeal joint of the right lower extremity. NEUROLOGIC:  The patient does have absent protective sensation to the both lower extremities of the foot.   MUSCULOSKELETAL:  The patient has no gross obvious osseous abnormalities. DERMATOLOGIC:  There are no wounds noted to the patient's left foot. To the right foot, there is a small scab to the lateral aspect of the patient's fifth metatarsal head where there was previously noted a wound. There is no tenderness or any erythema to this area. To the plantar aspect of the patient's first metatarsal head, there is a significantly thickened callus with hemorrhagic changes and the callus, significantly thickened keratosis to this area. Upon palpation of this area, approximately 5 mL of mixed purulent and phelgmatic tissue was expressed from this area. Upon probing of this area, I was able to probe approximately a centimeter and a half deep down to the capsule of the metatarsophalangeal joint. The area had mild malodor, and the wound itself was approximately 0.4 x 0.3 x 1.5 cm deep. LABORATORY DATA:  I reviewed all the patient's laboratory findings, including the wound culture results taken in the emergency department. IMPRESSION:  1. Right diabetic foot infection secondary to uncontrolled diabetes mellitus with a history of uncontrolled diabetic peripheral neuropathy. 2.  Ulceration of plantar aspect of the right first metatarsal head due to diabetes with underlying peripheral neuropathy. PLAN:  I saw the patient, assessed at bedside. I reviewed his x-rays. I have ordered an MRI due to the fact that I expressed purulence at bedside and the fact that he has wound cultures growing currently Enterococcus faecalis, Pasteurella multocida, Staphylococcus simulans and mixed in annie. As well as due to the chronicity of this wound, I have suspicion that he may have an underlying abscess to the area as well as possible early changes associated with osteomyelitis to this area.   I discussed with the patient that we may need to either this week perform an incision and drainage of the abscess despite his improvement on antibiotics in that he may need a first metatarsal hydrodissection or a possible first ray amputation depending on what the MRI shows. As we have previously discussed in the office that due to his uncontrolled diabetes and the presence of the location of the wound that this was always a high-risk situation. The patient is in agreement and proceeding with ordering the MRI as he would like as much information as possible and would like to be able to rule out any sort of underlying infection before proceeding with any surgery. I recommend continuing with IV antibiotics as we await for the patient's final sensitivities at this time. I have discuss this plan with Dr. Janis Momin. I will continue to follow with the patient.       EDEN [email or web address]  D:  05/13/2020 14:10  T:  05/13/2020 16:18  JOB #:  [id number]

## 2020-05-14 PROBLEM — E11.628 TYPE 2 DIABETES MELLITUS WITH RIGHT DIABETIC FOOT INFECTION (HCC): Status: ACTIVE | Noted: 2020-05-14

## 2020-05-14 PROBLEM — L97.415 DIABETIC ULCER OF RIGHT MIDFOOT ASSOCIATED WITH TYPE 2 DIABETES MELLITUS, WITH MUSCLE INVOLVEMENT WITHOUT EVIDENCE OF NECROSIS (HCC): Status: ACTIVE | Noted: 2020-05-14

## 2020-05-14 PROBLEM — E11.621 DIABETIC ULCER OF RIGHT MIDFOOT ASSOCIATED WITH TYPE 2 DIABETES MELLITUS, WITH MUSCLE INVOLVEMENT WITHOUT EVIDENCE OF NECROSIS (HCC): Status: ACTIVE | Noted: 2020-05-14

## 2020-05-14 PROBLEM — L08.9 TYPE 2 DIABETES MELLITUS WITH RIGHT DIABETIC FOOT INFECTION (HCC): Status: ACTIVE | Noted: 2020-05-14

## 2020-05-14 LAB
ANION GAP SERPL CALC-SCNC: 3 MMOL/L (ref 5–15)
BACTERIA SPEC CULT: ABNORMAL
BUN SERPL-MCNC: 12 MG/DL (ref 6–20)
BUN/CREAT SERPL: 14 (ref 12–20)
CALCIUM SERPL-MCNC: 8.4 MG/DL (ref 8.5–10.1)
CHLORIDE SERPL-SCNC: 106 MMOL/L (ref 97–108)
CO2 SERPL-SCNC: 26 MMOL/L (ref 21–32)
CREAT SERPL-MCNC: 0.84 MG/DL (ref 0.7–1.3)
CRP SERPL HS-MCNC: >9.5 MG/L
DATE LAST DOSE: ABNORMAL
ERYTHROCYTE [DISTWIDTH] IN BLOOD BY AUTOMATED COUNT: 13.1 % (ref 11.5–14.5)
ERYTHROCYTE [SEDIMENTATION RATE] IN BLOOD: 66 MM/HR (ref 0–20)
GLUCOSE BLD STRIP.AUTO-MCNC: 184 MG/DL (ref 65–100)
GLUCOSE BLD STRIP.AUTO-MCNC: 200 MG/DL (ref 65–100)
GLUCOSE BLD STRIP.AUTO-MCNC: 202 MG/DL (ref 65–100)
GLUCOSE BLD STRIP.AUTO-MCNC: 259 MG/DL (ref 65–100)
GLUCOSE SERPL-MCNC: 225 MG/DL (ref 65–100)
GRAM STN SPEC: ABNORMAL
GRAM STN SPEC: ABNORMAL
HCT VFR BLD AUTO: 32.4 % (ref 36.6–50.3)
HGB BLD-MCNC: 10.8 G/DL (ref 12.1–17)
MCH RBC QN AUTO: 28.5 PG (ref 26–34)
MCHC RBC AUTO-ENTMCNC: 33.3 G/DL (ref 30–36.5)
MCV RBC AUTO: 85.5 FL (ref 80–99)
NRBC # BLD: 0 K/UL (ref 0–0.01)
NRBC BLD-RTO: 0 PER 100 WBC
PLATELET # BLD AUTO: 204 K/UL (ref 150–400)
PMV BLD AUTO: 11 FL (ref 8.9–12.9)
POTASSIUM SERPL-SCNC: 3.5 MMOL/L (ref 3.5–5.1)
RBC # BLD AUTO: 3.79 M/UL (ref 4.1–5.7)
REPORTED DOSE,DOSE: ABNORMAL UNITS
REPORTED DOSE/TIME,TMG: ABNORMAL
SERVICE CMNT-IMP: ABNORMAL
SODIUM SERPL-SCNC: 135 MMOL/L (ref 136–145)
VANCOMYCIN TROUGH SERPL-MCNC: 12.6 UG/ML (ref 5–10)
WBC # BLD AUTO: 7.5 K/UL (ref 4.1–11.1)

## 2020-05-14 PROCEDURE — 86141 C-REACTIVE PROTEIN HS: CPT

## 2020-05-14 PROCEDURE — 65270000032 HC RM SEMIPRIVATE

## 2020-05-14 PROCEDURE — 74011636637 HC RX REV CODE- 636/637: Performed by: INTERNAL MEDICINE

## 2020-05-14 PROCEDURE — 74011000258 HC RX REV CODE- 258: Performed by: FAMILY MEDICINE

## 2020-05-14 PROCEDURE — 80202 ASSAY OF VANCOMYCIN: CPT

## 2020-05-14 PROCEDURE — 36415 COLL VENOUS BLD VENIPUNCTURE: CPT

## 2020-05-14 PROCEDURE — 74011250636 HC RX REV CODE- 250/636: Performed by: FAMILY MEDICINE

## 2020-05-14 PROCEDURE — 85027 COMPLETE CBC AUTOMATED: CPT

## 2020-05-14 PROCEDURE — 85652 RBC SED RATE AUTOMATED: CPT

## 2020-05-14 PROCEDURE — 74011250637 HC RX REV CODE- 250/637: Performed by: INTERNAL MEDICINE

## 2020-05-14 PROCEDURE — 80048 BASIC METABOLIC PNL TOTAL CA: CPT

## 2020-05-14 PROCEDURE — 82962 GLUCOSE BLOOD TEST: CPT

## 2020-05-14 PROCEDURE — 0JBQ0ZZ EXCISION OF RIGHT FOOT SUBCUTANEOUS TISSUE AND FASCIA, OPEN APPROACH: ICD-10-PCS | Performed by: PODIATRIST

## 2020-05-14 PROCEDURE — 74011636637 HC RX REV CODE- 636/637: Performed by: FAMILY MEDICINE

## 2020-05-14 PROCEDURE — 94760 N-INVAS EAR/PLS OXIMETRY 1: CPT

## 2020-05-14 PROCEDURE — 74011250637 HC RX REV CODE- 250/637: Performed by: FAMILY MEDICINE

## 2020-05-14 RX ADMIN — INSULIN GLARGINE 10 UNITS: 100 INJECTION, SOLUTION SUBCUTANEOUS at 22:04

## 2020-05-14 RX ADMIN — Medication 10 ML: at 06:30

## 2020-05-14 RX ADMIN — DULOXETINE 30 MG: 30 CAPSULE, DELAYED RELEASE ORAL at 09:55

## 2020-05-14 RX ADMIN — Medication 10 ML: at 22:07

## 2020-05-14 RX ADMIN — LISINOPRIL 20 MG: 20 TABLET ORAL at 09:55

## 2020-05-14 RX ADMIN — ATORVASTATIN CALCIUM 20 MG: 20 TABLET, FILM COATED ORAL at 09:55

## 2020-05-14 RX ADMIN — INSULIN LISPRO 3 UNITS: 100 INJECTION, SOLUTION INTRAVENOUS; SUBCUTANEOUS at 06:30

## 2020-05-14 RX ADMIN — HEPARIN SODIUM 5000 UNITS: 5000 INJECTION, SOLUTION INTRAVENOUS; SUBCUTANEOUS at 01:10

## 2020-05-14 RX ADMIN — VANCOMYCIN HYDROCHLORIDE 1750 MG: 10 INJECTION, POWDER, LYOPHILIZED, FOR SOLUTION INTRAVENOUS at 13:00

## 2020-05-14 RX ADMIN — GABAPENTIN 2400 MG: 600 TABLET, FILM COATED ORAL at 22:05

## 2020-05-14 RX ADMIN — VANCOMYCIN HYDROCHLORIDE 1750 MG: 10 INJECTION, POWDER, LYOPHILIZED, FOR SOLUTION INTRAVENOUS at 01:10

## 2020-05-14 RX ADMIN — INSULIN LISPRO 5 UNITS: 100 INJECTION, SOLUTION INTRAVENOUS; SUBCUTANEOUS at 12:05

## 2020-05-14 RX ADMIN — INSULIN LISPRO 3 UNITS: 100 INJECTION, SOLUTION INTRAVENOUS; SUBCUTANEOUS at 17:03

## 2020-05-14 RX ADMIN — HEPARIN SODIUM 5000 UNITS: 5000 INJECTION, SOLUTION INTRAVENOUS; SUBCUTANEOUS at 12:07

## 2020-05-14 RX ADMIN — CEFEPIME HYDROCHLORIDE 2 G: 2 INJECTION, POWDER, FOR SOLUTION INTRAVENOUS at 17:04

## 2020-05-14 RX ADMIN — GABAPENTIN 1200 MG: 600 TABLET, FILM COATED ORAL at 06:30

## 2020-05-14 RX ADMIN — CEFEPIME HYDROCHLORIDE 2 G: 2 INJECTION, POWDER, FOR SOLUTION INTRAVENOUS at 04:04

## 2020-05-14 NOTE — PROGRESS NOTES
[facility]  Hospitalist Group                                                                                          Hospitalist Progress Note  Kathy Henderson NP  Answering service: [phone] OR [phone] from in house phone        Date of Service:  2020  NAME:  Juni Boone  :  1965  MRN:  [id number]      Admission Summary:   The patient is a 26-year-old gentleman with past medical history as below who presents to the hospital with Right leg pain.  Patient reports that he pain in his right leg.  Reports that he has abscess and a blister on his right foot that has been there for at least 4 to 6 weeks.  Patient reports that he has been seeing a podiatrist who is been \"cutting dead skin around the blister\".  Patient reports that he started having some upper thigh pain today on the right leg associated with some nausea vomiting and feeling hot along with chills. Interval history / Subjective:    Seen and examined patient. States that he is feeling great today and would like to go home. Had a full night of sleep. He's able to walk on his right foot without any pain. Denies any dizziness, syncope, shortness of breath, chest pain or tightness, nausea, vomiting or diarrhea. Assessment & Plan:     # Sepsis: due to non healing to diabetic ulcer w/ cellulitis of R foot   - Xray and MRI negative for osteomyelitis  -Venous duplex BLE negative for DVTs    - Continue Cefepine and Vancomycin   - Wound cultures with enterococcus faecalis, pasteurella multocida, staphylococcus simulans and mixed annie   - Blood cultures- NGTD x 3 days.    - Wound care following   - Podiatry following        # COVID tested on admission- Negative      # DM type 2:  -HbA1c > 10; he needs to be on insulin  - BG monitoring   - Insulin sliding scale  - Diabetic management consulted  - Diabetic diet      # Hypertension:  -stopped IVFluids  resume Hctz and  Lisinopril as tolerated  LE emanuel suggest patient has pulmonary hypertension  Consider ECHO in the outpatient setting and avoid excessive fluids while inpatient      # Hyperlipidemia: Continue home medications and continue to monitor  Peripheral neuropathy- cont gabapentin  Psych- stable on home meds      Code status: Full   DVT prophylaxis: Heparin     Care Plan discussed with: Patient/Family and Nurse  Anticipated Disposition: Home w/Family  Anticipated Discharge: 24 hours to 48 hours     Hospital Problems  Date Reviewed: 4/3/2020          Codes Class Noted POA    Diabetic ulcer of right midfoot associated with type 2 diabetes mellitus, with muscle involvement without evidence of necrosis ([facility] ICD-10-CM: E11.621, L97.415  ICD-9-CM: 250.80, 707.14  5/14/2020 Unknown        Type 2 diabetes mellitus with right diabetic foot infection ([facility] [address] ICD-10-CM: E11.628, L08.9  ICD-9-CM: 250.80, 686.9  5/14/2020 Unknown        Sepsis ([facility] ICD-10-CM: A41.9  ICD-9-CM: 038.9, 995.91  5/11/2020 Unknown                Review of Systems:   A comprehensive review of systems was negative except for that written in the HPI. Vital Signs:    Last 24hrs VS reviewed since prior progress note. Most recent are:  Visit Vitals  /83   Pulse 76   Temp 97.9 °F (36.6 °C)   Resp 17   Ht 6' 2.02\" (1.88 m)   Wt 121.1 kg (267 lb)   SpO2 94%   BMI 34.27 kg/m²         Intake/Output Summary (Last 24 hours) at 5/14/2020 1406  Last data filed at 5/14/2020 0955  Gross per 24 hour   Intake 240 ml   Output 1250 ml   Net -1010 ml        Physical Examination:             Constitutional:  No acute distress, cooperative, pleasant    ENT:  Oral mucosa moist, oropharynx benign. Resp:  CTA bilaterally. No wheezing/rhonchi/rales. No accessory muscle use   CV:  Regular rhythm, normal rate, no murmurs, gallops, rubs    GI:  Soft, non distended, non tender. normoactive bowel sounds, no hepatosplenomegaly     Musculoskeletal:  No edema, warm, 2+ pulses throughout.  Dressing noted to right foot, CDI Neurologic:  Moves all extremities. AAOx3, CN II-XII reviewed     Psych:  Good insight, Not anxious nor agitated. Data Review:    Review and/or order of clinical lab test      Labs:     Recent Labs     05/14/20  0025 05/12/20  0353   WBC 7.5 9.4   HGB 10.8* 11.6*   HCT 32.4* 36.2*    169     Recent Labs     05/14/20  0025 05/13/20 0322 05/12/20  0353   * 138 136   K 3.5 3.5 3.6    107 104   CO2 26 25 25   BUN 12 9 11   CREA 0.84 0.72 0.80   * 174* 215*   CA 8.4* 8.3* 7.8*     Recent Labs     05/11/20  1454   SGOT 16   ALT 31      TBILI 0.6   TP 8.0   ALB 3.8   GLOB 4.2*     Recent Labs     05/13/20 0322 05/12/20  0353   INR 1.0 1.0   PTP 10.5 10.8   APTT 27.1 27.8      Recent Labs     05/11/20  1454   FERR 285      No results found for: FOL, RBCF   No results for input(s): PH, PCO2, PO2 in the last 72 hours.   Recent Labs     05/12/20  0353   TROIQ <0.05     No results found for: CHOL, CHOLX, CHLST, CHOLV, HDL, HDLP, LDL, LDLC, DLDLP, TGLX, TRIGL, TRIGP, CHHD, CHHDX  Lab Results   Component Value Date/Time    Glucose (POC) 259 (H) 05/14/2020 11:31 AM    Glucose (POC) 202 (H) 05/14/2020 06:08 AM    Glucose (POC) 259 (H) 05/13/2020 09:00 PM    Glucose (POC) 219 (H) 05/13/2020 04:19 PM    Glucose (POC) 205 (H) 05/13/2020 11:55 AM     Lab Results   Component Value Date/Time    Color YELLOW/STRAW 05/11/2020 09:58 PM    Appearance CLEAR 05/11/2020 09:58 PM    Specific gravity 1.015 05/11/2020 09:58 PM    pH (UA) 5.5 05/11/2020 09:58 PM    Protein Negative 05/11/2020 09:58 PM    Glucose 250 (A) 05/11/2020 09:58 PM    Ketone Negative 05/11/2020 09:58 PM    Bilirubin Negative 05/11/2020 09:58 PM    Urobilinogen 1.0 05/11/2020 09:58 PM    Nitrites Negative 05/11/2020 09:58 PM    Leukocyte Esterase Negative 05/11/2020 09:58 PM         Medications Reviewed:     Current Facility-Administered Medications   Medication Dose Route Frequency    cefepime (MAXIPIME) 2 g in 0.9% sodium chloride (MBP/ADV) 100 mL  2 g IntraVENous Q12H    insulin glargine (LANTUS) injection 10 Units  10 Units SubCUTAneous QHS    heparin (porcine) injection 5,000 Units  5,000 Units SubCUTAneous Q12H    atorvastatin (LIPITOR) tablet 20 mg  20 mg Oral DAILY    DULoxetine (CYMBALTA) capsule 30 mg  30 mg Oral DAILY    sodium chloride (NS) flush 5-40 mL  5-40 mL IntraVENous Q8H    sodium chloride (NS) flush 5-40 mL  5-40 mL IntraVENous PRN    acetaminophen (TYLENOL) tablet 650 mg  650 mg Oral Q4H PRN    vancomycin (VANCOCIN) 1750 mg in  ml infusion  1,750 mg IntraVENous Q12H    dextrose 10% infusion 0-250 mL  0-250 mL IntraVENous PRN    insulin lispro (HUMALOG) injection   SubCUTAneous AC&HS    acetaminophen (TYLENOL) tablet 650 mg  650 mg Oral Q6H PRN    Or    acetaminophen (TYLENOL) suppository 650 mg  650 mg Rectal Q6H PRN    gabapentin (NEURONTIN) tablet 2,400 mg  2,400 mg Oral QPM    lisinopriL (PRINIVIL, ZESTRIL) tablet 20 mg  20 mg Oral DAILY    gabapentin (NEURONTIN) tablet 1,200 mg  1,200 mg Oral 7am    sodium chloride (NS) flush 5-10 mL  5-10 mL IntraVENous PRN    glucose chewable tablet 16 g  4 Tab Oral PRN    glucagon (GLUCAGEN) injection 1 mg  1 mg IntraMUSCular PRN    dextrose 10% infusion 0-250 mL  0-250 mL IntraVENous PRN    Vancomycin Pharmacy to Dose   Other Rx Dosing/Monitoring    sodium chloride (NS) flush 5-10 mL  5-10 mL IntraVENous PRN     ______________________________________________________________________  EXPECTED LENGTH OF STAY: 3d 16h  ACTUAL LENGTH OF STAY:          Lucila Castaneda 446, NP

## 2020-05-14 NOTE — PROGRESS NOTES
Day #3 of cefepime  Indication:  diabetic foot infection  Current regimen:  2 gram Q8H  Abx regimen: vancomycin + cefepime  Recent Labs     20  0025 20  0322 20  0353 20  1454   WBC 7.5  --  9.4 14.9*   CREA 0.84 0.72 0.80 0.94   BUN 12 9 11 13     Est CrCl: >100 ml/min  Temp (24hrs), Av.2 °F (36.8 °C), Min:97.9 °F (36.6 °C), Max:98.4 °F (36.9 °C)      Plan: Change to cefepime 2 gram Q12H; MRI confirmation of no osteomyelitis

## 2020-05-14 NOTE — PROGRESS NOTES
Bedside and Verbal shift change report given to Allen Dover 81 (oncoming nurse) by Robel Cee RN (offgoing nurse). Report included the following information SBAR and Kardex.

## 2020-05-14 NOTE — PROGRESS NOTES
Patient with no pain today and tolerated diet well. Roberto Macedo to see if he will need surgery or not. Bedside shift change report given to Lukas E [address] (oncoming nurse) by Daniella Pinon RN (offgoing nurse). Report included the following information SBAR, Kardex, MAR and Recent Results.

## 2020-05-14 NOTE — DIABETES MGMT
JOSH GARBER  CLINICAL NURSE SPECIALIST CONSULT  PROGRAM FOR DIABETES HEALTH    INITIAL NOTE    Presentation   Jolie Alarcon is a 53 y.o. male admitted with right foot pain. He reported 2 foot ulcers that his podiatrist had been treating. PMH: HTN/DM2/hypercholesteremia. Current clinical course has been complicated by hyperglycemia. Diabetes: Patient has known Type 2 diabetes A1C 10.6% , treated Amaryl and Metformin. Family history unknown for diabetes. Consulted by Provider for advanced diabetes nursing assessment and care, specifically related to   [] Transitioning off Gabrielle Inaja   [] Inpatient management strategy  [x] Home management assessment  [x] Survival skill education    Diabetes-related medical history  Acute complications  hyperglycemia  Neurological complications  Peripheral neuropathy  Microvascular disease  NONE  Macrovascular disease  Foot wounds  Other associated conditions     HTN/hypercholesteremia    Diabetes medication history  Drug class Currently in use Discontinued Never used   Biguanide Metformin ER 750mg QD     DDP-4 inhibitor       Sulfonylurea Amaryl 4mg daily     Thiazolidinedione      GLP-1 RA      SGLT-2 inhibitors      Basal insulin      Bolus insulin        Subjective   I met with Mr. January Barbosa today regarding his daily management of his diabetes. He is eager to learn how to manage his diet the correct way in order to better manage his diabetes. He last saw his PCP 3months ago, was scheduled for appt. Yesterday but was in hospital.    Patient reports the following home diabetes self-care practices:  Eating pattern  [x] Breakfast Cereal(Cornflakes/Special K) with 2% milk, fruit (loves grapes the most, bananas, apples,strawberries etc.) sometimes has sausage biscuit   [x] Lunch  Sometimes skips lunch (when's he on the road) if home he grabs a sandwich (balogna, ham w/mustard) tomato sandwhich on white bread.   [x] Dinner  Roast/pork chops with veggies (beans/corn/potato)pork chops; hamberger w/FF (sometimes)    [x] Beverages Bonilla, unsweet teas    He states breads are his weakness but changed to pumpernickel and rye thinking it would be better for him. Physical activity pattern-minimal      Monitoring pattern-once daily    Taking medications pattern  [x] Consistent administration  [x] Affordable    Social determinants of health impacting diabetes self-management practices   Concerned that you need to know more about how to stay healthy with diabetes    Objective   Physical exam  General Alert, oriented and in no acute distres. Conversant and cooperative. Vital Signs   Visit Vitals  /71   Pulse 81   Temp 98.2 °F (36.8 °C)   Resp 17   Ht 6' 2.02\" (1.88 m)   Wt 121.6 kg (268 lb)   SpO2 94%   BMI 34.41 kg/m²     Skin  Warm and dry. Heart   Regular rate and rhythm. No murmurs, rubs or gallops  Lungs  Clear to auscultation without rales or rhonchi  Extremities No foot wounds    Diabetic foot exam: known bilateral peripheral neuropathy    Left Foot     Visual Exam: normal    Pulse DP: 2+ (normal)   Filament test: reduced sensation      Right Foot   Visual Exam: ulcer- foot bandaged with only toes exposed   Pulse DP: 2+ (normal)   Filament test: deferred.     Laboratory  Lab Results   Component Value Date/Time    Hemoglobin A1c 10.6 (H) 05/11/2020 02:54 PM     No results found for: LDL, LDLC, DLDLP  Lab Results   Component Value Date/Time    Creatinine 0.84 05/14/2020 12:25 AM     Lab Results   Component Value Date/Time    Sodium 135 (L) 05/14/2020 12:25 AM    Potassium 3.5 05/14/2020 12:25 AM    Chloride 106 05/14/2020 12:25 AM    CO2 26 05/14/2020 12:25 AM    Anion gap 3 (L) 05/14/2020 12:25 AM    Glucose 225 (H) 05/14/2020 12:25 AM    BUN 12 05/14/2020 12:25 AM    Creatinine 0.84 05/14/2020 12:25 AM    BUN/Creatinine ratio 14 05/14/2020 12:25 AM    GFR est AA >60 05/14/2020 12:25 AM    GFR est non-AA >60 05/14/2020 12:25 AM    Calcium 8.4 (L) 05/14/2020 12:25 AM    Bilirubin, total 0.6 05/11/2020 02:54 PM    AST (SGOT) 16 05/11/2020 02:54 PM    Alk. phosphatase 108 05/11/2020 02:54 PM    Protein, total 8.0 05/11/2020 02:54 PM    Albumin 3.8 05/11/2020 02:54 PM    Globulin 4.2 (H) 05/11/2020 02:54 PM    A-G Ratio 0.9 (L) 05/11/2020 02:54 PM    ALT (SGPT) 31 05/11/2020 02:54 PM     Lab Results   Component Value Date/Time    ALT (SGPT) 31 05/11/2020 02:54 PM       Factors affecting BG pattern  Factor Dose Comments   Nutrition:  Carb-controlled meals     60 grams/meal      Infection Right foot wound      Blood glucose pattern        Assessment and Plan   Nursing Diagnosis Risk for unstable blood glucose pattern   Nursing Intervention Domain 5251 Decision-making Support   Nursing Interventions Examined current inpatient diabetes control   Explored factors facilitating and impeding inpatient management  Identified self-management practices impeding diabetes control  Explored corrective strategies with patient and responsible inpatient provider   Informed patient of rational for insulin strategy while hospitalized  Instructed patient in recognizing carbohydrates that he is consuming that he did not realize. Evaluation   Mr. Gutierrez with uncontrolled  Type 2 diabetes, and A1C 10.6% hasn't achieved inpatient blood glucose target of 100-180mg/dl. BG trends since admission at or above 200mg/dl. Is currently on basal and correction insulin only. Basal insulin amount increased up to 22units yesterday in divided doses and had minimal impact of overnight BG. This indicates he is insulin resistant and needs more insulin to decrease his BG. Inpatient blood glucose management has been impacted by  [] Kidney dysfunction  [] Erratic meal consumption  [] Glucocorticoid use  [x]  infection  It is important that his BG stays consistently under 200 in order to promote would healing.     Recommendations/Discharge Planning   While in hospital:    Continue and Increase Basal insulin   [x] 0.2 to 0.3 units/kg dosing =30 units daily,      ADD mealtime Bolus insulin-10 units Humalog    Continue Corrective insulin  [x] Insulin-resistant sensitivity (BMI >27)    Discharge Planning Recommendations:    1. Restart home PTA diabetic medications, but INCREASE Metformin to full dosing slowly as he tolerates. 2. Since A1C 10.6%, it is necessary that he start on basal insulin. 30  units daily ( Daily Lantus or NPH BID -depending on insurance)    3. He will need a script for pen needles. 4. Information given to him re: carbohydrate counting and meal planning per his request.    Billing Code(s)   Thank you for including us in their care. I spent 60 minutes in direct patient care today for this patient.   Time includes chart review, face to face with patient and collaboration with interdisciplinary care team.  IP subsequent hospital care - 60 minutes      ALEXA Sr  Program for Diabetes Health  Access via JOSE [address] [phone]

## 2020-05-14 NOTE — PROGRESS NOTES
Progress Note    Patient: Jo Guerin MRN: [id number]  SSN: [id number]    YOB: 1965  Age: 53 y.o. Sex: male      Admit Date: 5/11/2020  * No surgery found *     Procedure:       Subjective:     Patient seen resting quiet and comfortably and no apparent distress. Wants to know when he can go home. Denies any pain in the foot. No fever, chills, nausea, vomiting, nausea, or diarrhea. Objective:     Visit Vitals  /83   Pulse 76   Temp 97.9 °F (36.6 °C)   Resp 17   Ht 6' 2.02\" (1.88 m)   Wt 121.1 kg (267 lb)   SpO2 94%   BMI 34.27 kg/m²        Physical Exam:  Neurovascular status of the right foot at baseline. The erythema and edema of the of the 1st MPJ area has resolved. No purulence expressed from the wound today. The wound today measures 4x4x8 mm.with a significantly keratotic rim and no probe to bone. Labs/Radiology Review: images and reports reviewed    Assessment:     Hospital Problems  Date Reviewed: 4/3/2020          Codes Class Noted POA    Diabetic ulcer of right midfoot associated with type 2 diabetes mellitus, with muscle involvement without evidence of necrosis [facility]) ICD-10-CM: E11.621, L97.415  ICD-9-CM: 250.80, 707.14  5/14/2020 Unknown        Type 2 diabetes mellitus with right diabetic foot infection ([facility] ICD-10-CM: E11.628, L08.9  ICD-9-CM: 250.80, 686.9  5/14/2020 Unknown        Sepsis ([facility] ICD-10-CM: A41.9  ICD-9-CM: 038.9, 995.91  5/11/2020 Unknown              Plan/Recommendations/Medical Decision Making:   -Patient's MRI showed no signs of osteomyelitis or deep abscess. Discussed these findings over the phone with the patient over the phone earlier this morning that surgical intervention would not be warranted and that we would be proceeding with a bedside debridement instead. -Reviewed final wound cultures showing enterococcus faecalis, staphylococcus simulans, and pasteurella multocida beta lactamase negative.   Recommend Augmentin 500/125 mg po bid and Doxycyline 100 mg po bid upon for 7 days discharge. - The wound today measures 4x4x8 mm.with a significantly keratotic rim and no probe to bone. A #15 blade scalpel was used to debride the wound of the hyperkeratotic wound, abnormal wound base and slough down to the exposed fat layer revealing an exposed red granular base for an excisional debridement. Bleeding was controlled with pressure. Post debridement measurements were then  20 x 20 x 3 mm. The wound was then cleaned with derma-cleanse and dressed with a dry gauze dressing.  -Will order Darco forefoot offloading shoe from [employer]. Discussed with patient that he must wear shoe until the would is healed.  -Patient will be stable for discharge once he has received the Darco wedge shoe.  -I will round on the patient again tomorrow morning and apply an opticell and dry gauze dressing to the right foot. Discharge Instructions  -Follow-up with Dr. Conklin Both in 1 week in office.  -Change opticell, gauze dressing every other day. -Augmentin, Doxycycline 7 days upon discharge  -Darco shoe to right foot at all times for weight bearing.

## 2020-05-15 VITALS
SYSTOLIC BLOOD PRESSURE: 138 MMHG | WEIGHT: 268.2 LBS | RESPIRATION RATE: 16 BRPM | HEART RATE: 75 BPM | BODY MASS INDEX: 34.42 KG/M2 | HEIGHT: 74 IN | TEMPERATURE: 98.6 F | OXYGEN SATURATION: 97 % | DIASTOLIC BLOOD PRESSURE: 83 MMHG

## 2020-05-15 LAB
ANION GAP SERPL CALC-SCNC: 5 MMOL/L (ref 5–15)
BUN SERPL-MCNC: 11 MG/DL (ref 6–20)
BUN/CREAT SERPL: 15 (ref 12–20)
CALCIUM SERPL-MCNC: 8.1 MG/DL (ref 8.5–10.1)
CHLORIDE SERPL-SCNC: 107 MMOL/L (ref 97–108)
CO2 SERPL-SCNC: 25 MMOL/L (ref 21–32)
CREAT SERPL-MCNC: 0.72 MG/DL (ref 0.7–1.3)
ERYTHROCYTE [DISTWIDTH] IN BLOOD BY AUTOMATED COUNT: 13.1 % (ref 11.5–14.5)
GLUCOSE BLD STRIP.AUTO-MCNC: 179 MG/DL (ref 65–100)
GLUCOSE BLD STRIP.AUTO-MCNC: 278 MG/DL (ref 65–100)
GLUCOSE SERPL-MCNC: 206 MG/DL (ref 65–100)
HCT VFR BLD AUTO: 33.3 % (ref 36.6–50.3)
HGB BLD-MCNC: 11.1 G/DL (ref 12.1–17)
MCH RBC QN AUTO: 29.1 PG (ref 26–34)
MCHC RBC AUTO-ENTMCNC: 33.3 G/DL (ref 30–36.5)
MCV RBC AUTO: 87.4 FL (ref 80–99)
NRBC # BLD: 0 K/UL (ref 0–0.01)
NRBC BLD-RTO: 0 PER 100 WBC
PLATELET # BLD AUTO: 181 K/UL (ref 150–400)
PMV BLD AUTO: 10.6 FL (ref 8.9–12.9)
POTASSIUM SERPL-SCNC: 3.5 MMOL/L (ref 3.5–5.1)
RBC # BLD AUTO: 3.81 M/UL (ref 4.1–5.7)
SERVICE CMNT-IMP: ABNORMAL
SERVICE CMNT-IMP: ABNORMAL
SODIUM SERPL-SCNC: 137 MMOL/L (ref 136–145)
WBC # BLD AUTO: 7.9 K/UL (ref 4.1–11.1)

## 2020-05-15 PROCEDURE — 82962 GLUCOSE BLOOD TEST: CPT

## 2020-05-15 PROCEDURE — 74011250637 HC RX REV CODE- 250/637: Performed by: FAMILY MEDICINE

## 2020-05-15 PROCEDURE — 74011636637 HC RX REV CODE- 636/637: Performed by: INTERNAL MEDICINE

## 2020-05-15 PROCEDURE — 74011250636 HC RX REV CODE- 250/636: Performed by: FAMILY MEDICINE

## 2020-05-15 PROCEDURE — 80048 BASIC METABOLIC PNL TOTAL CA: CPT

## 2020-05-15 PROCEDURE — 85027 COMPLETE CBC AUTOMATED: CPT

## 2020-05-15 PROCEDURE — 36415 COLL VENOUS BLD VENIPUNCTURE: CPT

## 2020-05-15 PROCEDURE — 74011000258 HC RX REV CODE- 258: Performed by: FAMILY MEDICINE

## 2020-05-15 PROCEDURE — 74011250637 HC RX REV CODE- 250/637: Performed by: INTERNAL MEDICINE

## 2020-05-15 RX ORDER — INSULIN GLARGINE 100 [IU]/ML
30 INJECTION, SOLUTION SUBCUTANEOUS
Status: DISCONTINUED | OUTPATIENT
Start: 2020-05-15 | End: 2020-05-15 | Stop reason: HOSPADM

## 2020-05-15 RX ORDER — INSULIN GLARGINE 100 [IU]/ML
INJECTION, SOLUTION SUBCUTANEOUS
Qty: 1 VIAL | Refills: 3 | Status: SHIPPED | OUTPATIENT
Start: 2020-05-15

## 2020-05-15 RX ORDER — DOXYCYCLINE 100 MG/1
100 TABLET ORAL 2 TIMES DAILY
Qty: 14 TAB | Refills: 0 | Status: SHIPPED | OUTPATIENT
Start: 2020-05-15

## 2020-05-15 RX ORDER — AMOXICILLIN AND CLAVULANATE POTASSIUM 500; 125 MG/1; MG/1
1 TABLET, FILM COATED ORAL 2 TIMES DAILY
Qty: 14 TAB | Refills: 0 | Status: SHIPPED | OUTPATIENT
Start: 2020-05-15

## 2020-05-15 RX ORDER — INSULIN LISPRO 100 [IU]/ML
10 INJECTION, SOLUTION INTRAVENOUS; SUBCUTANEOUS
Status: DISCONTINUED | OUTPATIENT
Start: 2020-05-15 | End: 2020-05-15 | Stop reason: HOSPADM

## 2020-05-15 RX ADMIN — HEPARIN SODIUM 5000 UNITS: 5000 INJECTION, SOLUTION INTRAVENOUS; SUBCUTANEOUS at 00:43

## 2020-05-15 RX ADMIN — LISINOPRIL 20 MG: 20 TABLET ORAL at 10:02

## 2020-05-15 RX ADMIN — Medication 10 ML: at 06:53

## 2020-05-15 RX ADMIN — ATORVASTATIN CALCIUM 20 MG: 20 TABLET, FILM COATED ORAL at 10:02

## 2020-05-15 RX ADMIN — INSULIN LISPRO 2 UNITS: 100 INJECTION, SOLUTION INTRAVENOUS; SUBCUTANEOUS at 06:54

## 2020-05-15 RX ADMIN — DULOXETINE 30 MG: 30 CAPSULE, DELAYED RELEASE ORAL at 10:02

## 2020-05-15 RX ADMIN — CEFEPIME HYDROCHLORIDE 2 G: 2 INJECTION, POWDER, FOR SOLUTION INTRAVENOUS at 03:40

## 2020-05-15 RX ADMIN — VANCOMYCIN HYDROCHLORIDE 1750 MG: 10 INJECTION, POWDER, LYOPHILIZED, FOR SOLUTION INTRAVENOUS at 00:41

## 2020-05-15 RX ADMIN — GABAPENTIN 1200 MG: 600 TABLET, FILM COATED ORAL at 06:51

## 2020-05-15 NOTE — PROGRESS NOTES
Bedside shift change report given to René Pedraza RN, and ERICA Gonsalez (oncoming nurse) by Luz Naylor RN (offgoing nurse). Report included the following information SBAR and Kardex.

## 2020-05-15 NOTE — DISCHARGE INSTRUCTIONS
Discharge Instructions       PATIENT ID: Madonna Alicea  MRN: [id number]   YOB: 1965    DATE OF ADMISSION: 5/11/2020  4:59 PM    DATE OF DISCHARGE: 5/15/2020    PRIMARY CARE PROVIDER: Abhishek Perez MD     ATTENDING PHYSICIAN: Latasha Jolly MD  DISCHARGING PROVIDER: Tanya Miller NP    To contact this individual call [phone] and ask the  to page. If unavailable ask to be transferred the Adult Hospitalist Department. DISCHARGE DIAGNOSES Diabetic Foot Ulcer on right foot    CONSULTATIONS: IP CONSULT TO PODIATRY    PROCEDURES/SURGERIES: * No surgery found *    PENDING TEST RESULTS:   At the time of discharge the following test results are still pending:None     FOLLOW UP APPOINTMENTS:   Follow-up Information     Follow up With Specialties Details Why Contact Van Escobedo DPM Foot and Ankle Surgery Schedule an appointment as soon as possible for a visit in 1 week Follow up  [address]  [address] [facility]      Abhishek Perez MD Internal Medicine Call in 1 week  [address]  [address]              ADDITIONAL CARE RECOMMENDATIONS:   Darco shoe to right foot at all times for weight bearing    Take medications as prescribed   We have added insulin to your home medications. DIET: Diabetic Diet      ACTIVITY: Activity as tolerated    WOUND CARE: Change opticell, gauze dressing every other day    EQUIPMENT needed:       DISCHARGE MEDICATIONS:   See Medication Reconciliation Form    · It is important that you take the medication exactly as they are prescribed. · Keep your medication in the bottles provided by the pharmacist and keep a list of the medication names, dosages, and times to be taken in your wallet. · Do not take other medications without consulting your doctor. NOTIFY YOUR PHYSICIAN FOR ANY OF THE FOLLOWING:   Fever over 101 degrees for 24 hours.    Chest pain, shortness of breath, fever, chills, nausea, vomiting, diarrhea, change in mentation, falling, weakness, bleeding. Severe pain or pain not relieved by medications. Or, any other signs or symptoms that you may have questions about.       DISPOSITION:   X Home With:   OT  PT  BORIS  RN       SNF/Inpatient Rehab/LTAC    Independent/assisted living    Hospice    Other:       Signed:   Sanchez Perdue NP  5/15/2020  11:24 AM

## 2020-05-15 NOTE — PROGRESS NOTES
SAÚL informed that patient would be discharged today and per diabetes treatment nurse, would go home on daily Lantus or NPH depending on insurance- patient is uninsured. SAÚL left a message for her to see which would be cheaper. SAÚL contacted the pharmacy to get their input on price differentiation and they stated it would be the same. SAÚL notified MD.    11:30am: CM followed up with patient and found out that he does have insurance and this was not discovered during the initial assessment apparently. Cm made a copy of his insurance card [facility]) and placed on the bedside chart. SAÚL will inform UR.      Zelalem CUNNINGHAMW, ACM

## 2020-05-15 NOTE — PROGRESS NOTES
Bedside shift change report given to Britt Hill RN (oncoming nurse) by Isaac Forman RN and Kena Guerin RN (offgoing nurse). Report included the following information SBAR, Kardex, Intake/Output, MAR and Recent Results.

## 2020-05-15 NOTE — PROGRESS NOTES
Hospital follow-up PCP transitional care appointment has been scheduled with Dr. Eyal Moyer for Monday, 5/18/20 at 12:00 p.m. Pending patient discharge.   Manolo Shah, Care Management Specialist.

## 2020-05-15 NOTE — DISCHARGE SUMMARY
Discharge Summary       PATIENT ID: Flavio Giang  MRN: [id number]   YOB: 1965    DATE OF ADMISSION: 5/11/2020  4:59 PM    DATE OF DISCHARGE: 05/15/2020  PRIMARY CARE PROVIDER: Juany Ellington MD     ATTENDING PHYSICIAN: Aubree Ferraro MD  DISCHARGING PROVIDER: Morena Paredes NP    To contact this individual call [phone] and ask the  to page. If unavailable ask to be transferred the Adult Hospitalist Department. CONSULTATIONS: IP CONSULT TO PODIATRY    PROCEDURES/SURGERIES: * No surgery found *    [address] [facility] COURSE:   The patient is a 53-year-old gentleman with past medical history as below who presents to the hospital with Right leg pain.  Patient reports that he pain in his right leg.  Reports that he has abscess and a blister on his right foot that has been there for at least 4 to 6 weeks.  Patient reports that he has been seeing a podiatrist who is been \"cutting dead skin around the blister\".  Patient reports that he started having some upper thigh pain today on the right leg associated with some nausea vomiting and feeling hot along with chills. 05/11/2020  IMPRESSION:  No acute pulmonary process      05/11/2020  CT Pelvis with contrast: IMPRESSION:   1. No focal fluid collection to suggest abscess. 2. Enlarged right inguinal lymph node with adjacent inflammatory stranding,  likely representing lymphadenitis. 05/11/20 Right foot: IMPRESSION: No plain radiographic evidence of osteomyelitis. MR can be performed  for further evaluation, as indicated. · 05/13/20 Duplex Lower extremities: No evidence of thrombosis in either lower extremity. · Incidental finding of pulsatile venous flow that may be suggestive of increased central venous pressure. Prominent right groin lymph node seen that measures approximately 1.6 x 4.6 cm.    05/13/20 MRI right foot: . Shallow ulceration plantar to the tibial hallux sesamoid. Underlying  cellulitis. No abscess.  2. No osteomyelitis.            DISCHARGE DIAGNOSES / PLAN:      # Sepsis: due to non healing to diabetic ulcer w/ cellulitis of R foot   - Sepsis resolved. - Xray and MRI negative for osteomyelitis  -Venous duplex BLE negative for DVTs   - Wound cultures with enterococcus faecalis, pasteurella multocida, staphylococcus simulans and mixed annie   - Blood cultures- NGTD   - Wound care instructions given   - Follow up with Dr Eugenio Dorantes in 1 week   - Rx for antibiotics         # COVID tested on admission- Negative      # DM type 2:  -HbA1c 10.6  - Restart home medications   - Lantus 30 units daily added      # Hypertension:  - Continue home medications      # Hyperlipidemia: Continue home medications and continue to monitor  Peripheral neuropathy- cont gabapentin  Psych- stable on home meds         ADDITIONAL CARE RECOMMENDATIONS:   Darco shoe to right foot at all times for weight bearing    Take medications as prescribed   We have added insulin to your home medications. PENDING TEST RESULTS:   At the time of discharge the following test results are still pending: None     FOLLOW UP APPOINTMENTS:    Follow-up Information     Follow up With Specialties Details Why Contact Info    Arsen ROBERTS DPM Foot and Ankle Surgery Schedule an appointment as soon as possible for a visit in 1 week Follow up  [address] [place] [address]      Lucila Weems MD Internal Medicine Call in 1 week  [address] [place] [address] I [address] [place] [address] [address]                DIET: Diabetic Diet      ACTIVITY: Activity as tolerated    WOUND CARE: Change opticell, gauze dressing every other day    EQUIPMENT needed:       DISCHARGE MEDICATIONS:  Current Discharge Medication List      START taking these medications    Details   doxycycline (ADOXA) 100 mg tablet Take 1 Tab by mouth two (2) times a day.   Qty: 14 Tab, Refills: 0      amoxicillin-clavulanate (Augmentin) 500-125 mg per tablet Take 1 Tab by mouth two (2) times a day. Qty: 14 Tab, Refills: 0      insulin glargine (Lantus U-100 Insulin) 100 unit/mL injection 30 units subcutaneous daily  Qty: 1 Vial, Refills: 3         CONTINUE these medications which have NOT CHANGED    Details   !! gabapentin (NEURONTIN) 600 mg tablet Take 2,400 mg by mouth every evening. 600 mg tablets - 2 tablets every morning and 4 tablets every evening. !! gabapentin (NEURONTIN) 600 mg tablet Take 1,200 mg by mouth daily. 600 mg tablets - 2 tablets every morning and 4 tablets every evening. glimepiride (AMARYL) 4 mg tablet Take 4 mg by mouth every morning. hydroCHLOROthiazide (HYDRODIURIL) 25 mg tablet Take 25 mg by mouth daily. lisinopriL (PRINIVIL, ZESTRIL) 20 mg tablet Take 20 mg by mouth daily. metFORMIN ER (GLUCOPHAGE XR) 750 mg tablet Take 750 mg by mouth daily. atorvastatin (LIPITOR) 20 mg tablet Take 20 mg by mouth daily. DULoxetine (CYMBALTA) 30 mg capsule Take 1 Cap by mouth daily. Qty: 30 Cap, Refills: 3       !! - Potential duplicate medications found. Please discuss with provider. NOTIFY YOUR PHYSICIAN FOR ANY OF THE FOLLOWING:   Fever over 101 degrees for 24 hours. Chest pain, shortness of breath, fever, chills, nausea, vomiting, diarrhea, change in mentation, falling, weakness, bleeding. Severe pain or pain not relieved by medications. Or, any other signs or symptoms that you may have questions about. DISPOSITION:   X Home With:   OT  PT  HH  RN       Long term SNF/Inpatient Rehab    Independent/assisted living    Hospice    Other:       PATIENT CONDITION AT DISCHARGE:     Functional status    Poor     Deconditioned    X Independent      Cognition   X  Lucid     Forgetful     Dementia      Catheters/lines (plus indication)    Rust     PICC     PEG    X None      Code status   X  Full code     DNR      PHYSICAL EXAMINATION AT DISCHARGE:  General:          Alert, cooperative, no distress, appears stated age. HEENT:           Atraumatic, anicteric sclerae, pink conjunctivae                          No oral ulcers, mucosa moist, throat clear, dentition fair  Neck:               Supple, symmetrical  Lungs:             Clear to auscultation bilaterally. No Wheezing or Rhonchi. No rales. Chest wall:      No tenderness  No Accessory muscle use. Heart:              Regular  rhythm,  No  murmur   No edema  Abdomen:        Soft, non-tender. Not distended. Bowel sounds normal  Extremities:     No cyanosis. No clubbing,                            Skin turgor normal, Capillary refill normal  Skin:                Not pale. Not Jaundiced  No rashes   Psych:             Not anxious or agitated.   Neurologic:      Alert, moves all extremities, answers questions appropriately and responds to commands       CHRONIC MEDICAL DIAGNOSES:  Problem List as of 5/15/2020 Date Reviewed: 4/3/2020          Codes Class Noted - Resolved    Diabetic ulcer of right midfoot associated with type 2 diabetes mellitus, with muscle involvement without evidence of necrosis ([facility] ICD-10-CM: E11.621, L97.415  ICD-9-CM: 250.80, 707.14  5/14/2020 - Present        Type 2 diabetes mellitus with right diabetic foot infection ([facility] ICD-10-CM: E11.628, L08.9  ICD-9-CM: 250.80, 686.9  5/14/2020 - Present        Sepsis ([facility] ICD-10-CM: A41.9  ICD-9-CM: 038.9, 995.91  5/11/2020 - Present              Greater than 40 minutes were spent with the patient on counseling and coordination of care    Signed:   Renetta Plasencia NP  5/15/2020  11:28 AM

## 2020-05-16 ENCOUNTER — PATIENT OUTREACH (OUTPATIENT)
Dept: INTERNAL MEDICINE CLINIC | Age: 55
End: 2020-05-16

## 2020-05-16 LAB
BACTERIA SPEC CULT: NORMAL
SERVICE CMNT-IMP: NORMAL

## 2020-05-16 NOTE — PROGRESS NOTES
Patient contacted regarding recent discharge and COVID-19 risk   Care Transition Nurse/ Ambulatory Care Manager contacted the patient by telephone to perform post discharge assessment. Verified name and  with patient as identifiers. Patient has following risk factors of: diabetes. CTN/ACM reviewed discharge instructions, medical action plan and red flags related to discharge diagnosis. Reviewed and educated them on any new and changed medications related to discharge diagnosis. Advised obtaining a 90-day supply of all daily and as-needed medications. Education provided regarding infection prevention, and signs and symptoms of COVID-19 and when to seek medical attention with patient who verbalized understanding. Discussed exposure protocols and quarantine from [address] you at higher risk for severe illness  and given an opportunity for questions and concerns. The patient agrees to contact the COVID-19 hotline [phone] or PCP office for questions related to their healthcare. CTN/ACM provided contact information for future reference. From CDC: Are you at higher risk for severe illness?  Wash your hands often.  Avoid close contact (6 feet, which is about two arm lengths) with people who are sick.  Put distance between yourself and other people if COVID-19 is spreading in your community.  Clean and disinfect frequently touched surfaces.  Avoid all cruise travel and non-essential air travel.  Call your healthcare professional if you have concerns about COVID-19 and your underlying condition or if you are sick. For more information on steps you can take to protect yourself, see CDC's How to Protect Yourself      Patient/family/caregiver given information for Geraldo Lieberman and agrees to enroll yes  Patient's preferred e-mail: [email or web address]  Patient's preferred phone number: [phone]  Based on Loop alert triggers, patient will be contacted by nurse care manager for worsening symptoms. Pt will be further monitored by COVID Loop Team based on severity of symptoms and risk factors.

## 2020-05-18 NOTE — CDMP QUERY
Query 1 of 1 Patient admitted with sepsis likely due to diabetic foot infection. Noted debridement of wound was done at bedside on 5/14/20. To accurately reflect the procedure performed please addend the note to include: ? Excisional debridement = cutting off or away--without replacement--devitalized tissue, necrosis or slough ? Non-excisional debridement = brushing, irrigating, scrubbing or washing of devitalized tissue, necrosis or slough Ex. Minor removal of loose fragments, scraping, whirlpool, mechanical or pulsatile lavage, irrigation The medical record reflects the following: 
  Risk Factors: diabetic foot wound Clinical Indicators:  
 
Podiatry PN 5/14- \" A #15 blade scalpel was used to debride the wound of the hyperkeratotic wound, abnormal wound base and slough down to the exposed fat layer revealing an exposed red granular base. Bleeding was controlled with pressure. Post debridement measurements were then  20 x 20 x 3 mm. The wound was then cleaned with derma-cleanse and dressed with a dry gauze dressing\"; Treatment: debridement; wound care; darco offloading shoe; Augmentin, Doxycycline PO at d/c Thank you, Geri Dietrich, RN, BSN, [facility] Clinical  
[facility] 
[phone]

## 2020-10-08 RX ORDER — DULOXETIN HYDROCHLORIDE 30 MG/1
CAPSULE, DELAYED RELEASE ORAL
Qty: 30 CAP | Refills: 0 | Status: SHIPPED | OUTPATIENT
Start: 2020-10-08 | End: 2021-04-27

## 2021-07-12 RX ORDER — DULOXETIN HYDROCHLORIDE 30 MG/1
CAPSULE, DELAYED RELEASE ORAL
Qty: 30 CAPSULE | Refills: 0 | OUTPATIENT
Start: 2021-07-12

## 2021-07-12 NOTE — TELEPHONE ENCOUNTER
Attempted to contact pt regarding refill for Cymbalta. Left message stating pt will need to be seen for appointment before it can be refilled, and pt was supposed to have blood testing and an EMG done. Advised pt to return call to office.

## 2021-08-02 RX ORDER — DULOXETIN HYDROCHLORIDE 30 MG/1
CAPSULE, DELAYED RELEASE ORAL
Qty: 30 CAPSULE | Refills: 0 | OUTPATIENT
Start: 2021-08-02

## 2021-08-16 NOTE — TELEPHONE ENCOUNTER
Left message for pt instructing to call back to schedule follow up appointment for refill of Cymbalta 30mg.

## 2022-03-19 PROBLEM — E11.628 TYPE 2 DIABETES MELLITUS WITH RIGHT DIABETIC FOOT INFECTION (HCC): Status: ACTIVE | Noted: 2020-05-14

## 2022-03-19 PROBLEM — L08.9 TYPE 2 DIABETES MELLITUS WITH RIGHT DIABETIC FOOT INFECTION (HCC): Status: ACTIVE | Noted: 2020-05-14

## 2022-03-19 PROBLEM — E11.621 DIABETIC ULCER OF RIGHT MIDFOOT ASSOCIATED WITH TYPE 2 DIABETES MELLITUS, WITH MUSCLE INVOLVEMENT WITHOUT EVIDENCE OF NECROSIS (HCC): Status: ACTIVE | Noted: 2020-05-14

## 2022-03-19 PROBLEM — L97.415 DIABETIC ULCER OF RIGHT MIDFOOT ASSOCIATED WITH TYPE 2 DIABETES MELLITUS, WITH MUSCLE INVOLVEMENT WITHOUT EVIDENCE OF NECROSIS (HCC): Status: ACTIVE | Noted: 2020-05-14

## 2022-03-19 PROBLEM — A41.9 SEPSIS (HCC): Status: ACTIVE | Noted: 2020-05-11

## 2023-05-11 RX ORDER — GLIMEPIRIDE 4 MG/1
TABLET ORAL
COMMUNITY
Start: 2020-02-03

## 2023-05-11 RX ORDER — METFORMIN HYDROCHLORIDE 750 MG/1
750 TABLET, EXTENDED RELEASE ORAL DAILY
COMMUNITY

## 2023-05-11 RX ORDER — AMOXICILLIN AND CLAVULANATE POTASSIUM 500; 125 MG/1; MG/1
1 TABLET, FILM COATED ORAL 2 TIMES DAILY
COMMUNITY
Start: 2020-05-15

## 2023-05-11 RX ORDER — LISINOPRIL 20 MG/1
20 TABLET ORAL DAILY
COMMUNITY

## 2023-05-11 RX ORDER — INSULIN GLARGINE 100 [IU]/ML
INJECTION, SOLUTION SUBCUTANEOUS
COMMUNITY
Start: 2020-05-15

## 2023-05-11 RX ORDER — HYDROCHLOROTHIAZIDE 25 MG/1
25 TABLET ORAL DAILY
COMMUNITY
Start: 2020-02-02

## 2023-05-11 RX ORDER — GABAPENTIN 600 MG/1
TABLET ORAL DAILY
COMMUNITY
Start: 2020-01-21

## 2023-05-11 RX ORDER — ATORVASTATIN CALCIUM 20 MG/1
20 TABLET, FILM COATED ORAL DAILY
COMMUNITY

## 2023-05-11 RX ORDER — DULOXETIN HYDROCHLORIDE 30 MG/1
1 CAPSULE, DELAYED RELEASE ORAL DAILY
COMMUNITY
Start: 2021-04-27

## 2023-05-11 RX ORDER — DOXYCYCLINE 100 MG/1
TABLET ORAL 2 TIMES DAILY
COMMUNITY
Start: 2020-05-15

## 2025-06-06 NOTE — PROGRESS NOTES
Jolie Alarcon is a 53 y.o. male who was seen by synchronous (real-time) audio-video technology on 5/7/2020. Subjective:   Jolie Alarcon is a 53 y.o. male who  has a past medical history of Diabetes ([address] Hypercholesterolemia, and Hypertension. who for the past 1 1/2 yrs,  noted intermittent numbness in both feet, described as on a cinder block, symmetric. Seeing a podiatrist Dr José Bassett due to foot ulcers. Consideration includes generalized sensory polyneuropathy due to uncontrolled diabetes. (Recent Hgba1c 10). Second, 2 months ago, patient noted constant  pain and numbness of both hands, symmetric, affecting all fingers, 5th finger is worse, more noticeable when driving, wakes patient up at night. Differentials include progression of his polyneuropathy or superimposed carpal tunnel syndrome ( sleeps with wrist bent). PCP started on Gabapentin 600 mg 2 tabs in am and 4 tabs pm with little benefit. Patient is now on Cymbalta 30 mg every day and pain cream with benefit. Also getting Gabapentin 600 mg 2 tabs in am and 4 tabs pm c/o PCP. Blood sugar is better control. Has not done blood work.       ROS:  Per HPI-  Otherwise 12 point ROS was negative    Social Hx:  Social History     Socioeconomic History    Marital status: UNKNOWN     Spouse name: Not on file    Number of children: Not on file    Years of education: Not on file    Highest education level: Not on file   Tobacco Use    Smoking status: Never Smoker    Smokeless tobacco: Never Used       Meds:  Current Outpatient Medications on File Prior to Visit   Medication Sig Dispense Refill    gabapentin (NEURONTIN) 600 mg tablet Take  by mouth. Take 2 in the am and up to 4 in the pm      glimepiride (AMARYL) 4 mg tablet TAKE 1 TABLET BY MOUTH ONCE DAILY WITH BREAKFAST OR FIRST MAIN MEAL OF THE DAY      hydroCHLOROthiazide (HYDRODIURIL) 25 mg tablet Take  by mouth daily.       lisinopriL (PRINIVIL, ZESTRIL) 10 mg tablet lisinopril 10 mg tablet Take 1 tablet every day by oral route.  metFORMIN (GLUCOPHAGE) 500 mg tablet Take  by mouth.  atorvastatin calcium (ATORVASTATIN PO) Take  by mouth.  DULoxetine (CYMBALTA) 30 mg capsule Take 1 Cap by mouth daily. 30 Cap 3     No current facility-administered medications on file prior to visit. Imaging:    CT Results (recent):  No results found for this or any previous visit. MRI Results (recent):  No results found for this or any previous visit. IR Results (recent):  No results found for this or any previous visit. VAS/US Results (recent):  No results found for this or any previous visit. Reviewed records in [employer] today    Lab Review     No visits with results within 3 Month(s) from this visit. Latest known visit with results is:   Hospital Outpatient Visit on 08/09/2011   Component Date Value Ref Range Status    Diagnosis 08/09/2011    Final                    Value:PROBABLY NORMAL STRESS TEST WITH ECG CHANGES AS NOTED ABOVE AND HYPERTENSIVE                           RESPONSE TO EXERCISE.190                          Confirmed by Vanita Rader M.D., Sarah Lombardi ([id number]),  Lefty Mann ([id number]) on                           8/9/2011 11:12:55 AM    Test indication 08/09/2011 Chest Discomfort   Final    Functional capacity 08/09/2011 Normal   Final    ECG Interp. Before Exercise 08/09/2011 Normal   Final    ECG Interp. During Exercise 08/09/2011 Depression upsloping OF 0,5 TO 0.8 MM IN INFEROLA   Final    Overall HR response to exercise 08/09/2011 appropriate   Final    Overall BP response to exercise 08/09/2011 resting hypertension - exaggerated response   Final    Max. Systolic BP 74/22/9422 822  mmHg Final    Max. Diastolic BP 58/15/2492 80  mmHg Final    Max. Heart rate 08/09/2011 153  BPM Final    Peak Ex METs 08/09/2011 8.5  METS Final    [facility]              Final    Attending physician 08/09/2011 ANA Rizo MD   Final Objective:     General: alert, cooperative, no distress   Mental  status: mental status: alert, oriented to person, place, and time, normal mood, behavior, speech, dress, motor activity, and thought processes   Resp: resp: normal effort and no respiratory distress   Neuro: neuro: no gross deficits   Skin: skin: no discoloration or lesions of concern on visible areas     Due to this being a TeleHealth evaluation, many elements of the physical examination are unable to be assessed. Assessment:       ICD-10-CM ICD-9-CM    1. Paresthesia R20.2 782.0      Generalized polyneuropathy due to uncontrolled diabetes      Plan:   1. Still needs to do blood test for TSH, Vit B12, Folate, Vit B6, MANDI, ESR, SPEP/ANA  2.EMG/NCS of the L UE and L LE with polyneuropathy and CTS protocol (Unfortunately, this is scheduled later due to [place]  situation)   3. Continue Cymbalta 30 mg every day   4. Continue compounded pain cream  5. Advise to wear bilateral wrist splints at night  6. Already on Gabapentin 600 mg 2 tabs in am and 4 tabs pm   7. Follow up with his Podiatrist for foot ulcers  8. Optimize medical management of diabetes c/o PCP to prevent worsening of polyneuropathy      Scheduled for EMG on June 12, 2020      CPT Codes 81879-73291 for Established Patients may apply to this Telehealth Visit    We discussed the expected course, resolution and complications of the diagnosis(es) in detail. Medication risks, benefits, costs, interactions, and alternatives were discussed as indicated. I advised him to contact the office if his condition worsens, changes or fails to improve as anticipated. He expressed understanding with the diagnosis(es) and plan.        Pursuant to the emergency declaration under the [facility], [facility] waiver authority and the [employer] and [employer] Act, this Virtual  Visit was conducted, with patient's consent, to reduce the patient's risk of exposure to COVID-19 and provide continuity of care for an established patient. Services were provided through a video synchronous discussion virtually to substitute for in-person clinic visit.        Yesi Young MD  Diplomate, American Board of Psychiatry and Neurology  Diplomate, Neuromuscular Medicine  Diplomate, American Board of Electrodiagnostic Medicine IMPROVE-DD Application Not Available